# Patient Record
Sex: FEMALE | Race: WHITE | NOT HISPANIC OR LATINO | Employment: FULL TIME | URBAN - METROPOLITAN AREA
[De-identification: names, ages, dates, MRNs, and addresses within clinical notes are randomized per-mention and may not be internally consistent; named-entity substitution may affect disease eponyms.]

---

## 2017-09-18 ENCOUNTER — CLINICAL SUPPORT (OUTPATIENT)
Dept: CARDIOLOGY | Facility: CLINIC | Age: 52
End: 2017-09-18
Payer: COMMERCIAL

## 2017-09-18 ENCOUNTER — OFFICE VISIT (OUTPATIENT)
Dept: INTERNAL MEDICINE | Facility: CLINIC | Age: 52
End: 2017-09-18
Payer: COMMERCIAL

## 2017-09-18 ENCOUNTER — LAB VISIT (OUTPATIENT)
Dept: LAB | Facility: HOSPITAL | Age: 52
End: 2017-09-18
Attending: FAMILY MEDICINE
Payer: COMMERCIAL

## 2017-09-18 VITALS
DIASTOLIC BLOOD PRESSURE: 70 MMHG | HEIGHT: 64 IN | BODY MASS INDEX: 32.97 KG/M2 | OXYGEN SATURATION: 96 % | SYSTOLIC BLOOD PRESSURE: 126 MMHG | WEIGHT: 193.13 LBS | TEMPERATURE: 98 F | HEART RATE: 85 BPM

## 2017-09-18 DIAGNOSIS — R06.09 DOE (DYSPNEA ON EXERTION): ICD-10-CM

## 2017-09-18 DIAGNOSIS — Z00.00 ROUTINE HEALTH MAINTENANCE: Primary | ICD-10-CM

## 2017-09-18 DIAGNOSIS — Z11.59 NEED FOR HEPATITIS C SCREENING TEST: ICD-10-CM

## 2017-09-18 DIAGNOSIS — F17.200 SMOKER: ICD-10-CM

## 2017-09-18 DIAGNOSIS — Z00.00 ROUTINE HEALTH MAINTENANCE: ICD-10-CM

## 2017-09-18 PROCEDURE — 80053 COMPREHEN METABOLIC PANEL: CPT

## 2017-09-18 PROCEDURE — 90732 PPSV23 VACC 2 YRS+ SUBQ/IM: CPT | Mod: S$GLB,,, | Performed by: FAMILY MEDICINE

## 2017-09-18 PROCEDURE — 99999 PR PBB SHADOW E&M-NEW PATIENT-LVL III: CPT | Mod: PBBFAC,,, | Performed by: FAMILY MEDICINE

## 2017-09-18 PROCEDURE — 85025 COMPLETE CBC W/AUTO DIFF WBC: CPT

## 2017-09-18 PROCEDURE — 80061 LIPID PANEL: CPT

## 2017-09-18 PROCEDURE — 90471 IMMUNIZATION ADMIN: CPT | Mod: S$GLB,,, | Performed by: FAMILY MEDICINE

## 2017-09-18 PROCEDURE — 99386 PREV VISIT NEW AGE 40-64: CPT | Mod: 25,S$GLB,, | Performed by: FAMILY MEDICINE

## 2017-09-18 PROCEDURE — 93000 ELECTROCARDIOGRAM COMPLETE: CPT | Mod: S$GLB,,, | Performed by: INTERNAL MEDICINE

## 2017-09-18 PROCEDURE — 86803 HEPATITIS C AB TEST: CPT

## 2017-09-18 PROCEDURE — 36415 COLL VENOUS BLD VENIPUNCTURE: CPT | Mod: PO

## 2017-09-18 RX ORDER — ALBUTEROL SULFATE 90 UG/1
2 AEROSOL, METERED RESPIRATORY (INHALATION) EVERY 6 HOURS PRN
Qty: 1 EACH | Refills: 11 | Status: SHIPPED | OUTPATIENT
Start: 2017-09-18 | End: 2022-07-12 | Stop reason: ALTCHOICE

## 2017-09-18 RX ORDER — HALOBETASOL PROPIONATE 0.5 MG/G
CREAM TOPICAL 2 TIMES DAILY
COMMUNITY
End: 2017-09-18 | Stop reason: SDUPTHER

## 2017-09-18 RX ORDER — TRIAMCINOLONE ACETONIDE 1 MG/G
CREAM TOPICAL 2 TIMES DAILY
Qty: 45 G | Refills: 11 | Status: SHIPPED | OUTPATIENT
Start: 2017-09-18 | End: 2017-11-29 | Stop reason: SDUPTHER

## 2017-09-18 RX ORDER — HALOBETASOL PROPIONATE 0.5 MG/G
CREAM TOPICAL 2 TIMES DAILY
Qty: 50 G | Refills: 2 | Status: SHIPPED | OUTPATIENT
Start: 2017-09-18

## 2017-09-18 NOTE — PROGRESS NOTES
Subjective:       Patient ID: Pilar Flores is a 51 y.o. female.    Chief Complaint: here for physical examination and issues  below    HPIhere for phys [facility] [place] care. One yr [employer] gradual. No chest pain w exertion. occas ant chest pain w stress  Past Medical History:   Diagnosis Date    Hyperlipidemia     Psoriasis     Smoker      Past Surgical History:   Procedure Laterality Date    UTERINE FIBROID SURGERY       Family History   Problem Relation Age of Onset    Heart disease Mother     Cancer Father      Social History     Social History    Marital status:      Spouse name: N/A    Number of children: N/A    Years of education: N/A     Social History Main Topics    Smoking status: Current Every Day Smoker     Packs/day: 1.50     Years: 35.00    Smokeless tobacco: Never Used    Alcohol use No    Drug use: No    Sexual activity: Not Asked     Other Topics Concern    None     Social History Narrative    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 She appears well-developed and well-nourished. No distress.   HENT:   Head: Atraumatic.   Right Ear: External ear normal.   Left Ear: External ear normal.   Nose: Nose normal.   Mouth/Throat: Oropharynx is clear and moist. No oropharyngeal exudate.   bilat tms nl   Eyes: Conjunctivae and EOM are normal. Pupils are equal, round, and reactive to light. No scleral icterus.   Neck: Normal range of motion. Neck supple. No thyromegaly present.   Cardiovascular: Normal rate, regular rhythm and normal heart sounds.    No murmur heard.  Pulmonary/Chest: Effort normal and breath sounds normal. No respiratory distress. She has no wheezes. She has no rales.   Abdominal: Soft. Bowel sounds are normal. She exhibits no distension and no mass. There is no  hepatosplenomegaly. There is no tenderness. There is no rebound and no guarding.   Musculoskeletal: Normal range of motion. She exhibits no edema or tenderness.   Lymphadenopathy:     She has no cervical adenopathy.   Neurological: She is alert and oriented to person, place, and time. No cranial nerve deficit. She exhibits normal muscle tone. Coordination normal.   Skin: Skin is warm. No rash noted. No erythema. No pallor.   Psychiatric: She has a normal mood and affect. Her behavior is normal. Judgment and thought content normal.   Nursing note and vitals reviewed.    ext no edema    Assessment:       1. Routine health maintenance    2. Smoker    3. Need for hepatitis C screening test    4. CYR (dyspnea on exertion)        Plan:       **Routine health maintenance  -     Comprehensive metabolic panel; Future; Expected date: 09/18/2017  -     Lipid panel; Future; Expected date: 09/18/2017  -     CBC auto differential; Future; Expected date: 09/18/2017    Smoker  -     Ambulatory referral to Smoking Cessation Program  -     CT Chest Lung Screening Low Dose; Future; Expected date: 09/18/2017    Need for hepatitis C screening test  -     Hepatitis C antibody; Future; Expected date: 09/18/2017    CYR (dyspnea on exertion)  -     EKG 12-lead; Future; Expected date: 09/18/2017  -     Exercise stress echo; Future    Other orders  -     halobetasol (ULTRAVATE) 0.05 % cream; Apply topically 2 (two) times daily.  Dispense: 50 g; Refill: 2  -     triamcinolone acetonide 0.1% (KENALOG) 0.1 % cream; Apply topically 2 (two) times daily.  Dispense: 45 g; Refill: 11  -     (In Office Administered) Pneumococcal Polysaccharide Vaccine (23 Valent) (SQ/IM)  -     albuterol 90 mcg/actuation inhaler; Inhale 2 puffs into the lungs every 6 (six) hours as needed for Wheezing.  Dispense: 1 each; Refill: 11    *  Return in about 4 weeks (around 10/16/2017).  If stress tst ok then pfts

## 2017-09-19 LAB
ALBUMIN SERPL BCP-MCNC: 3.9 G/DL
ALP SERPL-CCNC: 96 U/L
ALT SERPL W/O P-5'-P-CCNC: 29 U/L
ANION GAP SERPL CALC-SCNC: 12 MMOL/L
AST SERPL-CCNC: 22 U/L
BASOPHILS # BLD AUTO: 0.04 K/UL
BASOPHILS NFR BLD: 0.3 %
BILIRUB SERPL-MCNC: 0.3 MG/DL
BUN SERPL-MCNC: 18 MG/DL
CALCIUM SERPL-MCNC: 9.7 MG/DL
CHLORIDE SERPL-SCNC: 104 MMOL/L
CHOLEST SERPL-MCNC: 256 MG/DL
CHOLEST/HDLC SERPL: 5.2 {RATIO}
CO2 SERPL-SCNC: 27 MMOL/L
CREAT SERPL-MCNC: 0.8 MG/DL
DIFFERENTIAL METHOD: ABNORMAL
EOSINOPHIL # BLD AUTO: 0.2 K/UL
EOSINOPHIL NFR BLD: 1.3 %
ERYTHROCYTE [DISTWIDTH] IN BLOOD BY AUTOMATED COUNT: 12.5 %
EST. GFR  (AFRICAN AMERICAN): >60 ML/MIN/1.73 M^2
EST. GFR  (NON AFRICAN AMERICAN): >60 ML/MIN/1.73 M^2
GLUCOSE SERPL-MCNC: 82 MG/DL
HCT VFR BLD AUTO: 43 %
HDLC SERPL-MCNC: 49 MG/DL
HDLC SERPL: 19.1 %
HGB BLD-MCNC: 14.1 G/DL
LDLC SERPL CALC-MCNC: 164 MG/DL
LYMPHOCYTES # BLD AUTO: 3.2 K/UL
LYMPHOCYTES NFR BLD: 26 %
MCH RBC QN AUTO: 31.4 PG
MCHC RBC AUTO-ENTMCNC: 32.8 G/DL
MCV RBC AUTO: 96 FL
MONOCYTES # BLD AUTO: 0.9 K/UL
MONOCYTES NFR BLD: 7.1 %
NEUTROPHILS # BLD AUTO: 8.1 K/UL
NEUTROPHILS NFR BLD: 64.7 %
NONHDLC SERPL-MCNC: 207 MG/DL
PLATELET # BLD AUTO: 236 K/UL
PMV BLD AUTO: 12.9 FL
POTASSIUM SERPL-SCNC: 4 MMOL/L
PROT SERPL-MCNC: 7.2 G/DL
RBC # BLD AUTO: 4.49 M/UL
SODIUM SERPL-SCNC: 143 MMOL/L
TRIGL SERPL-MCNC: 215 MG/DL
WBC # BLD AUTO: 12.45 K/UL

## 2017-09-20 LAB — HCV AB SERPL QL IA: NEGATIVE

## 2017-09-27 ENCOUNTER — CLINICAL SUPPORT (OUTPATIENT)
Dept: SMOKING CESSATION | Facility: CLINIC | Age: 52
End: 2017-09-27
Payer: COMMERCIAL

## 2017-09-27 DIAGNOSIS — F17.210 HEAVY SMOKER (MORE THAN 20 CIGARETTES PER DAY): Primary | ICD-10-CM

## 2017-09-27 PROCEDURE — 99999 PR PBB SHADOW E&M-EST. PATIENT-LVL I: CPT | Mod: PBBFAC,,,

## 2017-09-27 PROCEDURE — 99404 PREV MED CNSL INDIV APPRX 60: CPT | Mod: S$GLB,,,

## 2017-09-27 RX ORDER — IBUPROFEN 200 MG
1 TABLET ORAL DAILY
Qty: 14 PATCH | Refills: 0 | Status: SHIPPED | OUTPATIENT
Start: 2017-09-27 | End: 2017-10-09 | Stop reason: SDUPTHER

## 2017-09-27 RX ORDER — BUPROPION HYDROCHLORIDE 150 MG/1
150 TABLET, EXTENDED RELEASE ORAL 2 TIMES DAILY
Qty: 60 TABLET | Refills: 0 | Status: SHIPPED | OUTPATIENT
Start: 2017-09-27 | End: 2017-10-23 | Stop reason: SDUPTHER

## 2017-09-27 RX ORDER — VARENICLINE TARTRATE 0.5 (11)-1
KIT ORAL
Qty: 1 PACKAGE | Refills: 0 | Status: SHIPPED | OUTPATIENT
Start: 2017-09-27 | End: 2017-10-23 | Stop reason: DRUGHIGH

## 2017-10-02 ENCOUNTER — CLINICAL SUPPORT (OUTPATIENT)
Dept: SMOKING CESSATION | Facility: CLINIC | Age: 52
End: 2017-10-02
Payer: COMMERCIAL

## 2017-10-02 DIAGNOSIS — F17.210 HEAVY SMOKER (MORE THAN 20 CIGARETTES PER DAY): Primary | ICD-10-CM

## 2017-10-02 PROCEDURE — 90853 GROUP PSYCHOTHERAPY: CPT | Mod: S$GLB,,,

## 2017-10-02 NOTE — Clinical Note
Patient seen for smoking cessation group session. She has not decreased the amount of cigarettes she is smoking since last week. She was encouraged to set a target quit date. The patient remains on the prescribed tobacco cessation medication regimen of 1 mg Chantix BID, 150 mg Wellbutrin SR BID and a 21 mg nicotine patch QD without any negative side effects at this time.

## 2017-10-03 NOTE — PROGRESS NOTES
Site: [facility]  Date:  10/2/17  Clinical Status of Patient: Outpatient   Length of Service and Code: 90 minutes - 61695   Number in Attendance: 3  Group Activities/Focus of Group: The patient will continue with group therapy sessions and medication monitoring by CTTS. Prescribed medication management will be by physician. Orientation, client introductions, completion of TCRS (Tobacco Cessation Rating Scale) learned addiction model, cues/triggers, personal reasons for quitting, medications, goals and quit date.    Target symptoms:  withdrawal and medication side effects             The following were rated moderate (3) to severe (4) on TCRS:       Moderate 3: none     Severe 4:   none  Patient's Response to Intervention: Patient seen for smoking cessation group session. She has not decreased the amount of cigarettes she is smoking since last week. She was encouraged to set a target quit date. The patient remains on the prescribed tobacco cessation medication regimen of 1 mg Chantix BID, 150 mg Wellbutrin SR BID and a 21 mg nicotine patch QD without any negative side effects at this time.     Progress Toward Goals and Other Mental Status Changes: The patient denies any abnormal behavioral or mental changes at this time.     Plan: The patient will continue with group therapy sessions and medication monitoring by CTTS. Prescribed medication management will be by physician.     Return to Clinic: 1 week

## 2017-10-05 ENCOUNTER — CLINICAL SUPPORT (OUTPATIENT)
Dept: CARDIOLOGY | Facility: CLINIC | Age: 52
End: 2017-10-05
Payer: COMMERCIAL

## 2017-10-05 DIAGNOSIS — R06.09 DOE (DYSPNEA ON EXERTION): ICD-10-CM

## 2017-10-05 PROCEDURE — 93321 DOPPLER ECHO F-UP/LMTD STD: CPT | Mod: S$GLB,,, | Performed by: NUCLEAR MEDICINE

## 2017-10-05 PROCEDURE — 93351 STRESS TTE COMPLETE: CPT | Mod: S$GLB,,, | Performed by: NUCLEAR MEDICINE

## 2017-10-06 LAB
DIASTOLIC DYSFUNCTION: NO
RETIRED EF AND QEF - SEE NOTES: 55 (ref 55–65)

## 2017-10-09 ENCOUNTER — CLINICAL SUPPORT (OUTPATIENT)
Dept: SMOKING CESSATION | Facility: CLINIC | Age: 52
End: 2017-10-09
Payer: COMMERCIAL

## 2017-10-09 DIAGNOSIS — F17.210 CIGARETTE NICOTINE DEPENDENCE WITHOUT COMPLICATION: Primary | ICD-10-CM

## 2017-10-09 DIAGNOSIS — F17.210 HEAVY SMOKER (MORE THAN 20 CIGARETTES PER DAY): ICD-10-CM

## 2017-10-09 PROCEDURE — 90853 GROUP PSYCHOTHERAPY: CPT | Mod: S$GLB,,,

## 2017-10-09 RX ORDER — IBUPROFEN 200 MG
1 TABLET ORAL DAILY
Qty: 14 PATCH | Refills: 0 | Status: SHIPPED | OUTPATIENT
Start: 2017-10-09 | End: 2017-10-23 | Stop reason: SDUPTHER

## 2017-10-09 NOTE — Clinical Note
Patient reports being tobacco free since 10/4/17. She was smoker of 1.5 packs of cigarettes per day for 40 years. Her spouse is quitting too. She is pleased with her progress and intends to never purchase cigarettes again.  The patient remains on the prescribed tobacco cessation medication regimen of 1 mg Chantix BID, 150 mg Wellbutrin SR BID and a 21 mg nicotine patch QD without any negative side effects at this time.

## 2017-10-09 NOTE — PROGRESS NOTES
Smoking Cessation Group Session #3    Site: [facility]  Date:  10/9/2017  Clinical Status of Patient: Outpatient   Length of Service and Code: 90 minutes - 94621   Number in Attendance: 2  Group Activities/Focus of Group:  completion of TCRS (Tobacco Cessation Rating Scale) reviewed strategies, controlling environment, cues, triggers, new goals set. Introduced high risk situations with preparation interventions, caffeine similarities with withdrawal issues of habit and nicotine, Alcohol, Understanding urges, cravings, stress and relaxation. Open discussion with intervention discussion.     Target symptoms:  withdrawal and medication side effects             The following were rated moderate (3) to severe (4) on TCRS:       Moderate 3: none     Severe 4:   none  Patient's Response to Intervention: Patient reports being tobacco free since 10/4/17. She was smoker of 1.5 packs of cigarettes per day for 40 years. Her spouse is quitting too. She is pleased with her progress and intends to never purchase cigarettes again.  The patient remains on the prescribed tobacco cessation medication regimen of 1 mg Chantix BID, 150 mg Wellbutrin SR BID and a 21 mg nicotine patch QD without any negative side effects at this time.     Progress Toward Goals and Other Mental Status Changes: The patient denies any abnormal behavioral or mental changes at this time.     Plan: The patient will continue with group therapy sessions and medication monitoring by CTTS. Prescribed medication management will be by physician.     Return to Clinic: 1 week    Quit Date: 10/4/17   Planned Quit Date: Quit 10/4/17

## 2017-10-16 ENCOUNTER — CLINICAL SUPPORT (OUTPATIENT)
Dept: SMOKING CESSATION | Facility: CLINIC | Age: 52
End: 2017-10-16
Payer: COMMERCIAL

## 2017-10-16 DIAGNOSIS — F17.210 CIGARETTE NICOTINE DEPENDENCE WITHOUT COMPLICATION: Primary | ICD-10-CM

## 2017-10-16 PROCEDURE — 90853 GROUP PSYCHOTHERAPY: CPT | Mod: S$GLB,,,

## 2017-10-16 NOTE — Clinical Note
Patient reports remaining tobacco free since 10/4/17. She did report feeling stressed at work and is aware this is a strong trigger for her. She inquired about nicotine lozenges for work to get past the urge to smoke. She has started using the stairs at work to help prevent weight gain. The patient remains on the prescribed tobacco cessation medication regimen of 1 mg Chantix BID with 150 mg Wellbutrin SR BID and a 21 mg nicotine patch QD without any negative side effects at this time.

## 2017-10-17 RX ORDER — ASPIRIN/CALCIUM CARB/MAGNESIUM 325 MG
4 TABLET ORAL
Qty: 72 LOZENGE | Refills: 0 | Status: SHIPPED | OUTPATIENT
Start: 2017-10-17 | End: 2017-11-06 | Stop reason: SDUPTHER

## 2017-10-17 NOTE — PROGRESS NOTES
Smoking Cessation Group Session #3    Site: [facility]  Date:  10/106/17  Clinical Status of Patient: Outpatient   Length of Service and Code: 90 minutes - 32012   Number in Attendance: 2  Group Activities/Focus of Group:  completion of TCRS (Tobacco Cessation Rating Scale) reviewed strategies, controlling environment, cues, triggers, new goals set. Introduced high risk situations with preparation interventions, caffeine similarities with withdrawal issues of habit and nicotine, Alcohol, Understanding urges, cravings, stress and relaxation. Open discussion with intervention discussion.    Target symptoms:  withdrawal and medication side effects             The following were rated moderate (3) to severe (4) on TCRS:       Moderate 3: none     Severe 4:   none  Patient's Response to Intervention: Patient reports remaining tobacco free since 10/4/17. She did report feeling stressed at work and is aware this is a strong trigger for her. She inquired about nicotine lozenges for work to get past the urge to smoke. She has started using the stairs at work to help prevent weight gain. The patient remains on the prescribed tobacco cessation medication regimen of 1 mg Chantix BID with 150 mg Wellbutrin SR BID and a 21 mg nicotine patch QD without any negative side effects at this time.     Progress Toward Goals and Other Mental Status Changes: The patient denies any abnormal behavioral or mental changes at this time.     Plan: The patient will continue with group therapy sessions and medication monitoring by CTTS. Prescribed medication management will be by physician.     Return to Clinic: 1 week    Quit Date: 10/4/17   Planned Quit Date: QUIT 10/4/17

## 2017-10-23 ENCOUNTER — TELEPHONE (OUTPATIENT)
Dept: RADIOLOGY | Facility: HOSPITAL | Age: 52
End: 2017-10-23

## 2017-10-23 ENCOUNTER — CLINICAL SUPPORT (OUTPATIENT)
Dept: SMOKING CESSATION | Facility: CLINIC | Age: 52
End: 2017-10-23
Payer: COMMERCIAL

## 2017-10-23 ENCOUNTER — OFFICE VISIT (OUTPATIENT)
Dept: INTERNAL MEDICINE | Facility: CLINIC | Age: 52
End: 2017-10-23
Payer: COMMERCIAL

## 2017-10-23 VITALS
OXYGEN SATURATION: 98 % | BODY MASS INDEX: 33.2 KG/M2 | HEART RATE: 76 BPM | SYSTOLIC BLOOD PRESSURE: 126 MMHG | WEIGHT: 194.44 LBS | TEMPERATURE: 97 F | DIASTOLIC BLOOD PRESSURE: 70 MMHG | HEIGHT: 64 IN

## 2017-10-23 DIAGNOSIS — Z12.31 ENCOUNTER FOR SCREENING MAMMOGRAM FOR MALIGNANT NEOPLASM OF BREAST: ICD-10-CM

## 2017-10-23 DIAGNOSIS — F17.210 CIGARETTE NICOTINE DEPENDENCE WITHOUT COMPLICATION: ICD-10-CM

## 2017-10-23 DIAGNOSIS — G47.00 INSOMNIA, UNSPECIFIED TYPE: ICD-10-CM

## 2017-10-23 DIAGNOSIS — F41.9 ANXIETY: ICD-10-CM

## 2017-10-23 DIAGNOSIS — R06.09 DOE (DYSPNEA ON EXERTION): Primary | ICD-10-CM

## 2017-10-23 DIAGNOSIS — R07.9 CHEST PAIN, UNSPECIFIED TYPE: ICD-10-CM

## 2017-10-23 DIAGNOSIS — F17.210 HEAVY SMOKER (MORE THAN 20 CIGARETTES PER DAY): ICD-10-CM

## 2017-10-23 DIAGNOSIS — E78.00 HYPERCHOLESTEREMIA: ICD-10-CM

## 2017-10-23 DIAGNOSIS — Z11.51 SCREENING FOR HPV (HUMAN PAPILLOMAVIRUS): ICD-10-CM

## 2017-10-23 DIAGNOSIS — Z87.891 EX-SMOKER: ICD-10-CM

## 2017-10-23 DIAGNOSIS — Z12.11 SCREEN FOR COLON CANCER: ICD-10-CM

## 2017-10-23 PROCEDURE — 90471 IMMUNIZATION ADMIN: CPT | Mod: S$GLB,,, | Performed by: FAMILY MEDICINE

## 2017-10-23 PROCEDURE — 99999 PR PBB SHADOW E&M-EST. PATIENT-LVL IV: CPT | Mod: PBBFAC,,, | Performed by: FAMILY MEDICINE

## 2017-10-23 PROCEDURE — 90472 IMMUNIZATION ADMIN EACH ADD: CPT | Mod: S$GLB,,, | Performed by: FAMILY MEDICINE

## 2017-10-23 PROCEDURE — 90853 GROUP PSYCHOTHERAPY: CPT | Mod: S$GLB,,,

## 2017-10-23 PROCEDURE — 99214 OFFICE O/P EST MOD 30 MIN: CPT | Mod: 25,S$GLB,, | Performed by: FAMILY MEDICINE

## 2017-10-23 PROCEDURE — 90686 IIV4 VACC NO PRSV 0.5 ML IM: CPT | Mod: S$GLB,,, | Performed by: FAMILY MEDICINE

## 2017-10-23 PROCEDURE — 90715 TDAP VACCINE 7 YRS/> IM: CPT | Mod: S$GLB,,, | Performed by: FAMILY MEDICINE

## 2017-10-23 RX ORDER — IBUPROFEN 200 MG
1 TABLET ORAL DAILY
Qty: 14 PATCH | Refills: 0 | Status: SHIPPED | OUTPATIENT
Start: 2017-10-23 | End: 2017-11-20 | Stop reason: SDUPTHER

## 2017-10-23 RX ORDER — VARENICLINE TARTRATE 1 MG/1
1 TABLET, FILM COATED ORAL 2 TIMES DAILY
Qty: 58 TABLET | Refills: 0 | Status: SHIPPED | OUTPATIENT
Start: 2017-10-23 | End: 2017-11-20 | Stop reason: SDUPTHER

## 2017-10-23 RX ORDER — LORAZEPAM 0.5 MG/1
0.5 TABLET ORAL EVERY 12 HOURS PRN
Qty: 20 TABLET | Refills: 0 | Status: SHIPPED | OUTPATIENT
Start: 2017-10-23 | End: 2017-11-29

## 2017-10-23 RX ORDER — BUPROPION HYDROCHLORIDE 150 MG/1
150 TABLET, EXTENDED RELEASE ORAL 2 TIMES DAILY
Qty: 60 TABLET | Refills: 0 | Status: SHIPPED | OUTPATIENT
Start: 2017-10-23 | End: 2017-11-20 | Stop reason: SDUPTHER

## 2017-10-23 RX ORDER — MELOXICAM 7.5 MG/1
7.5 TABLET ORAL DAILY PRN
Qty: 30 TABLET | Refills: 2 | Status: SHIPPED | OUTPATIENT
Start: 2017-10-23 | End: 2018-01-26 | Stop reason: SDUPTHER

## 2017-10-23 NOTE — Clinical Note
Patient reports remaining tobacco free since 10/4/17. She reports having a stressful job and this is her strongest trigger to smoke. She and her spouse have quit smoking together. The patient remains on the prescribed tobacco cessation medication regimen of 1 mg Chantix BID with a 150 mg Wellbutrin SR BID with a 21 mg nicotine patch QD without any negative side effects at this time.

## 2017-10-23 NOTE — PROGRESS NOTES
Smoking Cessation Group Session #5    Site: [facility]  Date:  10/23/2017  Clinical Status of Patient: Outpatient   Length of Service and Code: 90 minutes - 63800   Number in Attendance: 2  Group Activities/Focus of Group:  completion of TCRS (Tobacco Cessation Rating Scale) reviewed strategies, habitual behavior, high risks situations, understanding urges and cravings, stress and relaxation with open discussion and additional interventions, Introduced lapses, relapses, understanding them and analyzing the situation of a lapse, conflict issues that may be linked to a lapse.     Target symptoms:  withdrawal and medication side effects             The following were rated moderate (3) to severe (4) on TCRS:       Moderate 3: none     Severe 4:   none  Patient's Response to Intervention: Patient reports remaining tobacco free since 10/4/17. She reports having a stressful job and this is her strongest trigger to smoke. She and her spouse have quit smoking together. The patient remains on the prescribed tobacco cessation medication regimen of 1 mg Chantix BID with a 150 mg Wellbutrin SR BID with a 21 mg nicotine patch QD without any negative side effects at this time.     Progress Toward Goals and Other Mental Status Changes: The patient denies any abnormal behavioral or mental changes at this time.     Plan: The patient will continue with group therapy sessions and medication monitoring by CTTS. Prescribed medication management will be by physician.     Return to Clinic: 1 week    Quit Date: 10/4/17   Planned Quit Date: Quit 10/4/17

## 2017-10-23 NOTE — PATIENT INSTRUCTIONS
Quick, Healthy Ways to Cook  Here are some tips for quick and nutritious food preparation. These methods will save you time and help to cut down on fat.  Stir-frying  If you dont have a wok, use a cast-iron or non-stick skillet. Most dishes can be cooked with just a tablespoon of oil if you heat the pan first. Buy pre-cut vegetables to reduce preparation time. Try stir-frying sliced lean beef or boneless, skinless chicken and ready-cut broccoli. Add a dash of soy sauce and some slices of fresh hilda root.  Microwaving  Microwaves cook very quickly. So most of the nutrients in the foods youre cooking dont have time to escape. Read the cooking directions carefully. Its easy to overcook foods. Use the microwave to cook baked potatoes or winter squash. You can also reheat leftovers and soup. Fish filets can be microwaved in minutes. Just add seasoning and a dash of milk. Then cover with wax paper and cook.  Crock pots  This handy kitchen appliance cooks food slowly at low temperatures. Set it up in the morning. Dinner will be ready and waiting for you when you get home. Soups, stews, and pot roasts all make great crock pot meals. Be sure to trim fat from meat before cooking. Extra-lean, less marbled cuts of meat become tender and juicy when cooked in a crock pot. Add more flavor by using different types of canned tomatoes, herbs, and spices.  Baking, broiling, and grilling  Bake, broil, or grill foods on a rack to drain fats away during cooking. This is a healthier way to eat. And its delicious as well. [place] meat and vegetables, too. Add bell peppers, tomatoes, onions, and mushrooms to kebobs. Or put vegetables in foil-wrapped packets.  Steaming  Steaming can be done in a microwave or on the stove top. Either way, it keeps in nutrients and flavor without adding fat. Ready-cut broccoli, cauliflower, and baby carrots can go right from the bag to the steamer.  Pressure cooking  By using steam, pressure cookers can  cook a pound of potatoes in just 4 minutes. Or a chicken stew in less than half an hour. A pressure cooker can also turn the toughest cut of meat into a tender main course. Dont over-season foods. Pressure cooking uses very little liquid, so flavors are stronger.  Poaching  In poaching, the food is covered with liquid. This could be broth, milk, or wine. The food is then gently simmered until done. Poaching uses less liquid than steaming or boiling. This means that delicate flavors are less diluted. Poaching works well for fish or eggs.  Date Last Reviewed: 6/8/2015  © 2111-8215 [employer]. [address] [place] [address], [place] [address]. All rights reserved. This information is not intended as a substitute for professional medical care. Always follow your healthcare professional's instructions.        Quick, Healthy Ways to Cook  Here are some tips for quick and nutritious food preparation. These methods will save you time and help to cut down on fat.  Stir-frying  If you dont have a wok, use a cast-iron or non-stick skillet. Most dishes can be cooked with just a tablespoon of oil if you heat the pan first. Buy pre-cut vegetables to reduce preparation time. Try stir-frying sliced lean beef or boneless, skinless chicken and ready-cut broccoli. Add a dash of soy sauce and some slices of fresh hilda root.  Microwaving  Microwaves cook very quickly. So most of the nutrients in the foods youre cooking dont have time to escape. Read the cooking directions carefully. Its easy to overcook foods. Use the microwave to cook baked potatoes or winter squash. You can also reheat leftovers and soup. Fish filets can be microwaved in minutes. Just add seasoning and a dash of milk. Then cover with wax paper and cook.  Crock pots  This handy kitchen appliance cooks food slowly at low temperatures. Set it up in the morning. Dinner will be ready and waiting for you when you get home. Soups, stews, and pot roasts all make  great crock pot meals. Be sure to trim fat from meat before cooking. Extra-lean, less marbled cuts of meat become tender and juicy when cooked in a crock pot. Add more flavor by using different types of canned tomatoes, herbs, and spices.  Baking, broiling, and grilling  Bake, broil, or grill foods on a rack to drain fats away during cooking. This is a healthier way to eat. And its delicious as well. [place] meat and vegetables, too. Add bell peppers, tomatoes, onions, and mushrooms to kebobs. Or put vegetables in foil-wrapped packets.  Steaming  Steaming can be done in a microwave or on the stove top. Either way, it keeps in nutrients and flavor without adding fat. Ready-cut broccoli, cauliflower, and baby carrots can go right from the bag to the steamer.  Pressure cooking  By using steam, pressure cookers can cook a pound of potatoes in just 4 minutes. Or a chicken stew in less than half an hour. A pressure cooker can also turn the toughest cut of meat into a tender main course. Dont over-season foods. Pressure cooking uses very little liquid, so flavors are stronger.  Poaching  In poaching, the food is covered with liquid. This could be broth, milk, or wine. The food is then gently simmered until done. Poaching uses less liquid than steaming or boiling. This means that delicate flavors are less diluted. Poaching works well for fish or eggs.  Date Last Reviewed: 6/8/2015  © 3095-2380 [employer]. [address] [place] [address], [place] [address]. All rights reserved. This information is not intended as a substitute for professional medical care. Always follow your healthcare professional's instructions.        Low-Cholesterol Diet  Your body needs cholesterol to build new cells and create certain hormones. There are 2 kinds of cholesterol in your blood:     · HDL (good) cholesterol. This prevents fat deposits (plaque) from building up in your arteries. In this way it protects against heart disease and  stroke.  · LDL (bad) cholesterol. This stays in your body and sticks to artery walls. Over time it may block blood flow to the heart and brain. This can cause a heart attack or stroke.  The cholesterol in your blood comes from 2 sources: cholesterol in food that you eat and cholesterol that your liver makes. You should limit the amount of cholesterol in your diet. But the cholesterol that your body makes has the greatest disease risk. And your body makes more cholesterol when your diet is high in bad fats (saturated and trans fats). There are 2 kinds of fats you can eat:  · Good fats, or unsaturated fats (mono-unsaturated and poly-unsaturated). They raise the level of good cholesterol and lower the level of bad cholesterol. Good fats are found in vegetable oils such as olive, sunflower, corn, and soybean oils, and in nuts and seeds.  · Bad fats, or saturated fats (including foods high in cholesterol) and trans fats. These raise your risk of disease. They lower the good cholesterol and raise the level of bad cholesterol. Bad fats are found in animal products, including meat, whole-milk dairy products, and butter. Some plants are also high in bad fats (coconut and palm plants). Trans fats are found in hard (stick) margarines. They are also in many fast foods and commercially baked goods. Soft margarine sold in tubs has fewer trans fats and is safer to use.  High blood cholesterol is usually due to a diet high in saturated fat, along with not being physically active. In some cases, genetics plays a role in causing high cholesterol. The tips below will help you create healthy eating habits that will help lower your blood cholesterol level.  Create a diet high in good fats, low in bad fats (and low in cholesterol)  The following steps will help you create a diet high in good fats and low in bad fats:  · Talk with your doctor before starting a low cholesterol diet or weight loss program.  · Learn to read nutrition  labels and select appropriate portion sizes.  · When cooking, use plant-based unsaturated vegetable oils (sunflower, corn, soybean, canola, peanut, and olive oils).  · Avoid saturated fats found in animal products such as meat, dairy (whole-milk, cheese and ice cream), poultry skin, and egg yolks. Plants high in saturated oils include coconut oil, palm oil, and palm kernel oil.  · If you eat meat, choose smaller portions and lean cuts, such as round, desire, sirloin, or loin. Eat more meatless meals.  · Replace meat with fish at least 2 times a week. Fish is an important source of the unsaturated fat called omega-3 fatty acids. This fat has potential to lower the risk of heart disease.  · Replace whole-milk dairy products with low-fat or nonfat products. Try soy products. Soy helps to reduce total cholesterol.  · Supplement your diet with protective fibers. Eat nuts, seeds, and whole grains rather than white rice and bread. These foods lower both cholesterol and triglyceride levels. (Triglycerides are another fat found in the blood.) Walnuts are one of the best sources of omega-3 fatty acids.  · Eat plenty of fresh fruits and vegetables daily.  · Avoid fast foods and commercial baked goods. Assume they contain saturated fat unless labeled otherwise.  Date Last Reviewed: 8/1/2016  © 4931-6691 [employer]. [address] [place] [address], [place] [address]. All rights reserved. This information is not intended as a substitute for professional medical care. Always follow your healthcare professional's instructions.

## 2017-10-25 ENCOUNTER — HOSPITAL ENCOUNTER (OUTPATIENT)
Dept: RADIOLOGY | Facility: HOSPITAL | Age: 52
Discharge: HOME | End: 2017-10-25
Attending: FAMILY MEDICINE

## 2017-10-25 DIAGNOSIS — F17.200 SMOKER: ICD-10-CM

## 2017-10-25 PROCEDURE — 76497 UNLISTED CT PROCEDURE: CPT | Mod: TC,PO

## 2017-10-30 ENCOUNTER — CLINICAL SUPPORT (OUTPATIENT)
Dept: SMOKING CESSATION | Facility: CLINIC | Age: 52
End: 2017-10-30
Payer: COMMERCIAL

## 2017-10-30 DIAGNOSIS — F17.210 CIGARETTE NICOTINE DEPENDENCE WITHOUT COMPLICATION: Primary | ICD-10-CM

## 2017-10-30 PROCEDURE — 99407 BEHAV CHNG SMOKING > 10 MIN: CPT | Mod: S$GLB,,,

## 2017-11-06 NOTE — PROGRESS NOTES
Subjective:       Patient ID: Pilar Flores is a 52 y.o. female.    Chief Complaint: Multiple issues see below    HPIstress test dec sensitivy ;wasnt able to complete; still some hogan less after dc tob; occas nonexert ant chest pain and left should pain only w rom  infreq anxiety poss related to smoking cess. ; req short term anxiety med. dwd options. D/w benzo sedation and no etoh w etc. Side effects and dosing discussed  ';mood fine  Past Medical History:   Diagnosis Date    Hyperlipidemia     Psoriasis     Smoker      Past Surgical History:   Procedure Laterality Date    UTERINE FIBROID SURGERY       Family History   Problem Relation Age of Onset    Heart disease Mother     Cancer Father      Social History     Social History    Marital status:      Spouse name: N/A    Number of children: N/A    Years of education: N/A     Social History Main Topics    Smoking status: Former Smoker     Packs/day: 1.50     Years: 40.00    Smokeless tobacco: Never Used      Comment: Quit 10/4/17.   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 HOGAN (dyspnea on exertion)    2. Chest pain, unspecified type    3. Ex-smoker    4. Hypercholesteremia    5. Encounter for screening mammogram for malignant neoplasm of breast    6. Screen for colon cancer    7. Screening for HPV (human papillomavirus)    8. Anxiety    9. Insomnia, unspecified type        Plan:       *HOGAN (dyspnea on exertion)  -     NM Myocardial Perfusion Spect Multi Pharmacologic; Future; Expected date: 10/23/2017  -     NM Multi Pharm Stress Cardiac Component; Future    Chest pain, unspecified type  -     NM Myocardial Perfusion Spect Multi Pharmacologic; Future;  Expected date: 10/23/2017  -     NM Multi Pharm Stress Cardiac Component; Future    Ex-smoker    Hypercholesteremia  -     Comprehensive metabolic panel; Future; Expected date: 10/23/2018  -     Lipid panel; Future; Expected date: 10/23/2018  -     TSH; Future; Expected date: 10/23/2018    Encounter for screening mammogram for malignant neoplasm of breast  -     Mammo Digital Screening Bilat with CAD; Future; Expected date: 10/23/2017    Screen for colon cancer  -     Case request GI: COLONOSCOPY    Screening for HPV (human papillomavirus)  -     Ambulatory referral to Gynecology    Anxiety    Insomnia, unspecified type    Other orders  -     (In Office Administered) Tdap Vaccine  -     meloxicam (MOBIC) 7.5 MG tablet; Take 1 tablet (7.5 mg total) by mouth daily as needed for Pain.  Dispense: 30 tablet; Refill: 2  -     lorazepam (ATIVAN) 0.5 MG tablet; Take 1 tablet (0.5 mg total) by mouth every 12 (twelve) hours as needed for Anxiety.  Dispense: 20 tablet; Refill: 0  -     Influenza - Quadrivalent (3 years & older) (PF)    **  Flu shot  She req Female gyn for pap    If stress test ok then pft  Cont no smoking  Has ldct soon  Asa  Low chol diet  F/u one yr if all ok above and hogan resolves off tob   PT when ready  Should exer  Short term prn mobic

## 2017-11-13 ENCOUNTER — TELEPHONE (OUTPATIENT)
Dept: CARDIOLOGY | Facility: CLINIC | Age: 52
End: 2017-11-13

## 2017-11-13 ENCOUNTER — PATIENT MESSAGE (OUTPATIENT)
Dept: INTERNAL MEDICINE | Facility: CLINIC | Age: 52
End: 2017-11-13

## 2017-11-13 DIAGNOSIS — R91.1 LUNG NODULE: Primary | ICD-10-CM

## 2017-11-13 NOTE — TELEPHONE ENCOUNTER
----- Message from Junito Victor sent at 11/9/2017 12:58 PM CST -----  Contact: [facility]   [phone]  Would like to consult with nurse regarding presription. Please call back at [phone].

## 2017-11-16 DIAGNOSIS — F17.210 CIGARETTE NICOTINE DEPENDENCE WITHOUT COMPLICATION: ICD-10-CM

## 2017-11-17 DIAGNOSIS — F17.210 CIGARETTE NICOTINE DEPENDENCE WITHOUT COMPLICATION: ICD-10-CM

## 2017-11-20 ENCOUNTER — CLINICAL SUPPORT (OUTPATIENT)
Dept: SMOKING CESSATION | Facility: CLINIC | Age: 52
End: 2017-11-20
Payer: COMMERCIAL

## 2017-11-20 DIAGNOSIS — F17.210 CIGARETTE NICOTINE DEPENDENCE WITHOUT COMPLICATION: Primary | ICD-10-CM

## 2017-11-20 PROCEDURE — 99407 BEHAV CHNG SMOKING > 10 MIN: CPT | Mod: S$GLB,,,

## 2017-11-20 RX ORDER — VARENICLINE TARTRATE 1 MG/1
1 TABLET, FILM COATED ORAL 2 TIMES DAILY
Qty: 58 TABLET | Refills: 0 | Status: SHIPPED | OUTPATIENT
Start: 2017-11-20 | End: 2022-07-12

## 2017-11-20 RX ORDER — IBUPROFEN 200 MG
1 TABLET ORAL DAILY
Qty: 28 PATCH | Refills: 0 | Status: SHIPPED | OUTPATIENT
Start: 2017-11-20 | End: 2022-07-12

## 2017-11-20 RX ORDER — ASPIRIN/CALCIUM CARB/MAGNESIUM 325 MG
4 TABLET ORAL
Qty: 108 LOZENGE | Refills: 0 | Status: SHIPPED | OUTPATIENT
Start: 2017-11-20 | End: 2022-07-12

## 2017-11-20 RX ORDER — BUPROPION HYDROCHLORIDE 150 MG/1
150 TABLET, EXTENDED RELEASE ORAL 2 TIMES DAILY
Qty: 60 TABLET | Refills: 0 | Status: SHIPPED | OUTPATIENT
Start: 2017-11-20 | End: 2022-07-12

## 2017-11-29 ENCOUNTER — HOSPITAL ENCOUNTER (OUTPATIENT)
Dept: RADIOLOGY | Facility: HOSPITAL | Age: 52
Discharge: HOME | End: 2017-11-29
Attending: PHYSICIAN ASSISTANT
Payer: COMMERCIAL

## 2017-11-29 ENCOUNTER — OFFICE VISIT (OUTPATIENT)
Dept: INTERNAL MEDICINE | Facility: CLINIC | Age: 52
End: 2017-11-29
Payer: COMMERCIAL

## 2017-11-29 VITALS
HEIGHT: 64 IN | DIASTOLIC BLOOD PRESSURE: 70 MMHG | HEART RATE: 94 BPM | BODY MASS INDEX: 32.13 KG/M2 | OXYGEN SATURATION: 95 % | WEIGHT: 188.19 LBS | TEMPERATURE: 98 F | SYSTOLIC BLOOD PRESSURE: 110 MMHG | RESPIRATION RATE: 16 BRPM

## 2017-11-29 DIAGNOSIS — R10.12 COLICKY LUQ ABDOMINAL PAIN: ICD-10-CM

## 2017-11-29 DIAGNOSIS — K57.32 DIVERTICULITIS OF LARGE INTESTINE WITHOUT PERFORATION OR ABSCESS WITHOUT BLEEDING: ICD-10-CM

## 2017-11-29 DIAGNOSIS — R10.12 COLICKY LUQ ABDOMINAL PAIN: Primary | ICD-10-CM

## 2017-11-29 PROBLEM — E78.5 HYPERLIPIDEMIA: Status: ACTIVE | Noted: 2017-11-29

## 2017-11-29 PROBLEM — E66.811 OBESITY, CLASS I, BMI 30.0-34.9 (SEE ACTUAL BMI): Status: ACTIVE | Noted: 2017-11-29

## 2017-11-29 PROBLEM — F17.200 SMOKER: Status: ACTIVE | Noted: 2017-11-29

## 2017-11-29 PROBLEM — L40.9 PSORIASIS: Status: ACTIVE | Noted: 2017-11-29

## 2017-11-29 PROBLEM — F41.9 ANXIETY: Status: ACTIVE | Noted: 2017-11-29

## 2017-11-29 PROBLEM — E66.9 OBESITY, CLASS I, BMI 30.0-34.9 (SEE ACTUAL BMI): Status: ACTIVE | Noted: 2017-11-29

## 2017-11-29 PROCEDURE — 99214 OFFICE O/P EST MOD 30 MIN: CPT | Mod: 25,S$GLB,, | Performed by: PHYSICIAN ASSISTANT

## 2017-11-29 PROCEDURE — 74020 XR ABDOMEN FLAT AND ERECT: CPT | Mod: TC,PO

## 2017-11-29 PROCEDURE — 96372 THER/PROPH/DIAG INJ SC/IM: CPT | Mod: S$GLB,,, | Performed by: INTERNAL MEDICINE

## 2017-11-29 PROCEDURE — 74020 XR ABDOMEN FLAT AND ERECT: CPT | Mod: 26,,, | Performed by: RADIOLOGY

## 2017-11-29 PROCEDURE — 99999 PR PBB SHADOW E&M-EST. PATIENT-LVL IV: CPT | Mod: PBBFAC,,, | Performed by: PHYSICIAN ASSISTANT

## 2017-11-29 RX ORDER — IBUPROFEN 200 MG
200 TABLET ORAL EVERY 6 HOURS PRN
COMMUNITY
End: 2018-01-30

## 2017-11-29 RX ORDER — CIPROFLOXACIN 500 MG/1
500 TABLET ORAL 2 TIMES DAILY
Qty: 20 TABLET | Refills: 0 | Status: SHIPPED | OUTPATIENT
Start: 2017-11-29 | End: 2017-12-09

## 2017-11-29 RX ORDER — KETOROLAC TROMETHAMINE 30 MG/ML
60 INJECTION, SOLUTION INTRAMUSCULAR; INTRAVENOUS
Status: COMPLETED | OUTPATIENT
Start: 2017-11-29 | End: 2017-11-29

## 2017-11-29 RX ORDER — TRIAMCINOLONE ACETONIDE 1 MG/G
1 CREAM TOPICAL 2 TIMES DAILY
Refills: 11 | COMMUNITY
Start: 2017-11-06 | End: 2022-07-12

## 2017-11-29 RX ORDER — ASPIRIN 81 MG/1
81 TABLET ORAL DAILY
COMMUNITY

## 2017-11-29 RX ORDER — DICYCLOMINE HYDROCHLORIDE 20 MG/1
20 TABLET ORAL EVERY 6 HOURS
Qty: 120 TABLET | Refills: 0 | Status: SHIPPED | OUTPATIENT
Start: 2017-11-29 | End: 2018-01-03 | Stop reason: SDUPTHER

## 2017-11-29 RX ORDER — OMEPRAZOLE 20 MG/1
20 TABLET, DELAYED RELEASE ORAL DAILY
COMMUNITY
End: 2022-07-12

## 2017-11-29 RX ADMIN — KETOROLAC TROMETHAMINE 60 MG: 30 INJECTION, SOLUTION INTRAMUSCULAR; INTRAVENOUS at 11:11

## 2017-11-29 NOTE — PROGRESS NOTES
Pt refused to wait in-clinic as advised 30 minutes after toradol injection, advised pt to call office PRN.

## 2017-11-29 NOTE — PROGRESS NOTES
"Subjective:       Patient ID: Pilar Flores is a 52 y.o.W/ female.    Chief Complaint: Abdominal Cramping; Flank Pain; and Generalized Body Aches    HPI         She comes in today accompanied by her  and has the above problem.  About 2 weeks ago, she developed some left upper quadrant abdominal discomfort that was about a 4/10 level of pain and it was there for about a few hours and then it disappeared.  She is normally a person that gets up and has a cup coffee and usually has a bowel movement each day.  She has been passing normal bowel movements with exception of this morning she had to strain little bit to get it out.  It was quite firm.  She started over the weekend on Friday with left upper quadrant abdominal pain, colicky that would come and go, slight loss of appetite, worsened with deep breathing-movement of her torso-or coughing which she has done very little of.  She went to work each day this week until today and yesterday at work it got extremely worse and was leveled about an 8/10 level of pain.  She doesn't recall if she did have a bowel movement yesterday or not.  She also had a fever last night 101.4°, body aches and myalgias, both knees ached and lower back ache.        She did have a colonoscopy years ago done in [place] and was told that time that she had ulcerative colitis but she disputed that fact.  Her problems with "colitis" disappeared after she was given medicine to promote medical menopause.  She's thought it was caused by the endometriosis aggravating her bowel and flaring up when she was having her menstrual cycle.  She never was told whether she had diverticulum or colon polyps.  She is not had a repeat colonoscopy since that time.        She denies any problem at present with nausea, anorexia, vomiting, hematemesis, coffee-ground emesis, BR RB, hematochezia, melena, excessive flatus, excessive eructations, distention of her abdomen, sweats, diaphoresis, near syncope.  It " is very hard for her to get up and down from bed.  She has to roll in her right side in order to help her get out of bed and also reverse the process in order to lay back on her back.  It's a very slow process for her to do that due to the discomfort she's having.    Review of Systems    Otherwise negative concerning the PULMONARY, CV, VASCULAR, GI, RENAL, , and GYN system review.    Objective:      Physical Exam    ENT: All essentially WNL but she looks a little dry in the mouth and may be a little dehydrated.  CHEST: Clear BS anterior to posterior with no wheeze, rhonchi, or rales.  She is not in any cardiorespiratory distress this time.  HEART: RSR.  S1 is greater than S2.  Pulse rate is 88 bpm and regular.  She has no peripheral edema.  Her color and warmth are all good at this time.  ABDOMEN: Exogenously obese and somewhat firm but without rigidity.  She is tender diffusely throughout the abdomen but is worse in the left upper quadrant.  She has no distention with air or fluid.  She is nonicteric in both the skin and the eyes.  Bowel sounds are very hypoactive but present.  CBC: She has a 24,000 white count with a left shift in her differential.  CHEM-20: Wasn't done at time of dictation.  ABDOMEN FLAT and UPRIGHT: Normal x-rays without any sign of obstruction.    Assessment:       1. Colicky LUQ abdominal pain    2. Diverticulitis of large intestine without perforation or abscess without bleeding        Plan:     1.  Start on been till 20 mg every 6 hours when necessary abdominal cramping.  2.  Start Cipro 500 mg twice a day ×10 days.  Recheck if symptoms increase or persist.  3.  Diet as tolerated.  She can also take an NSAID or Tylenol to help relieve the abdominal ache and control fever.

## 2017-12-01 ENCOUNTER — TELEPHONE (OUTPATIENT)
Dept: INTERNAL MEDICINE | Facility: CLINIC | Age: 52
End: 2017-12-01

## 2017-12-01 NOTE — LETTER
December 1, 2017      [facility]  [address]  [place] [address]  Phone: [phone]  Fax: [phone]       Patient: Pilar Flores   YOB: 1965  Date of Visit: 11/29/2017    To Whom It May Concern:    Pilar Flores was seen in my office on 11/29/2017. She may return to work on 12/04/2017.     If you have any questions or concerns, or if I can be of further assistance, please do not hesitate to contact me.    Sincerely,      Brenda Blackman CMA, OUMAR Candelario PA-C

## 2018-01-03 DIAGNOSIS — R10.12 COLICKY LUQ ABDOMINAL PAIN: ICD-10-CM

## 2018-01-03 DIAGNOSIS — K57.32 DIVERTICULITIS OF LARGE INTESTINE WITHOUT PERFORATION OR ABSCESS WITHOUT BLEEDING: ICD-10-CM

## 2018-01-04 RX ORDER — DICYCLOMINE HYDROCHLORIDE 20 MG/1
20 TABLET ORAL EVERY 6 HOURS
Qty: 120 TABLET | Refills: 0 | Status: SHIPPED | OUTPATIENT
Start: 2018-01-04 | End: 2018-02-03

## 2018-01-30 RX ORDER — MELOXICAM 7.5 MG/1
7.5 TABLET ORAL DAILY PRN
Qty: 30 TABLET | Refills: 2 | Status: SHIPPED | OUTPATIENT
Start: 2018-01-30 | End: 2018-12-20 | Stop reason: SDUPTHER

## 2018-02-09 ENCOUNTER — PATIENT OUTREACH (OUTPATIENT)
Dept: ADMINISTRATIVE | Facility: HOSPITAL | Age: 53
End: 2018-02-09

## 2018-02-09 NOTE — PROGRESS NOTES
Schedule annual mammogram on 02/28//2018 at 3:45 pm. Patient in agreement and vocalize understanding. A appointment reminder sent in the mail.

## 2018-02-27 ENCOUNTER — TELEPHONE (OUTPATIENT)
Dept: RADIOLOGY | Facility: HOSPITAL | Age: 53
End: 2018-02-27

## 2018-02-28 ENCOUNTER — HOSPITAL ENCOUNTER (OUTPATIENT)
Dept: RADIOLOGY | Facility: HOSPITAL | Age: 53
Discharge: HOME | End: 2018-02-28
Attending: FAMILY MEDICINE
Payer: COMMERCIAL

## 2018-02-28 VITALS — BODY MASS INDEX: 32.11 KG/M2 | HEIGHT: 64 IN | WEIGHT: 188.06 LBS

## 2018-02-28 DIAGNOSIS — R91.1 LUNG NODULE: ICD-10-CM

## 2018-02-28 DIAGNOSIS — Z12.31 ENCOUNTER FOR SCREENING MAMMOGRAM FOR MALIGNANT NEOPLASM OF BREAST: ICD-10-CM

## 2018-02-28 PROCEDURE — 77063 BREAST TOMOSYNTHESIS BI: CPT | Mod: 26,,, | Performed by: RADIOLOGY

## 2018-02-28 PROCEDURE — 77067 SCR MAMMO BI INCL CAD: CPT | Mod: 26,,, | Performed by: RADIOLOGY

## 2018-02-28 PROCEDURE — 71250 CT THORAX DX C-: CPT | Mod: 26,,, | Performed by: RADIOLOGY

## 2018-02-28 PROCEDURE — 77067 SCR MAMMO BI INCL CAD: CPT | Mod: TC,PO

## 2018-02-28 PROCEDURE — 71250 CT THORAX DX C-: CPT | Mod: TC,PO

## 2018-06-01 ENCOUNTER — PATIENT MESSAGE (OUTPATIENT)
Dept: INTERNAL MEDICINE | Facility: CLINIC | Age: 53
End: 2018-06-01

## 2018-06-01 DIAGNOSIS — R91.1 LUNG NODULE: ICD-10-CM

## 2018-06-05 ENCOUNTER — CLINICAL SUPPORT (OUTPATIENT)
Dept: SMOKING CESSATION | Facility: CLINIC | Age: 53
End: 2018-06-05
Payer: COMMERCIAL

## 2018-06-05 DIAGNOSIS — F17.200 NICOTINE DEPENDENCE: Primary | ICD-10-CM

## 2018-06-05 PROCEDURE — 99407 BEHAV CHNG SMOKING > 10 MIN: CPT | Mod: S$GLB,,,

## 2018-06-05 NOTE — PROGRESS NOTES
Successful contact with patient regarding tobacco cessation quit #1. Pt states, she reminded tobacco free for 3 months. Pt's not ready to return to the program at this time. Pt provided with her benefit status and contact information to schedule an appointment when she's ready. Will follow up with her in 3 months.

## 2018-09-26 ENCOUNTER — CLINICAL SUPPORT (OUTPATIENT)
Dept: SMOKING CESSATION | Facility: CLINIC | Age: 53
End: 2018-09-26
Payer: COMMERCIAL

## 2018-09-26 DIAGNOSIS — F17.200 NICOTINE DEPENDENCE: Primary | ICD-10-CM

## 2018-09-26 PROCEDURE — 99407 BEHAV CHNG SMOKING > 10 MIN: CPT | Mod: S$GLB,,,

## 2018-09-26 NOTE — PROGRESS NOTES
Successful contact with patient regarding tobacco cessation quit #1. Pt states, she was unable to become tobacco free, she currently smoke 10 cigarettes per day,and she is not ready to return to the program at this time. Pt informed of her benefit status and contact information to schedule an appointment when she is ready. Will update the tobacco cessation smart form for 12 months on quit #1 and resolve the episode.

## 2018-12-20 RX ORDER — MELOXICAM 7.5 MG/1
7.5 TABLET ORAL DAILY PRN
Qty: 30 TABLET | Refills: 0 | Status: SHIPPED | OUTPATIENT
Start: 2018-12-20 | End: 2019-01-19

## 2019-10-25 DIAGNOSIS — Z12.11 COLON CANCER SCREENING: ICD-10-CM

## 2020-10-05 ENCOUNTER — PATIENT MESSAGE (OUTPATIENT)
Dept: ADMINISTRATIVE | Facility: HOSPITAL | Age: 55
End: 2020-10-05

## 2020-10-06 ENCOUNTER — PATIENT MESSAGE (OUTPATIENT)
Dept: ADMINISTRATIVE | Facility: HOSPITAL | Age: 55
End: 2020-10-06

## 2022-07-12 ENCOUNTER — OFFICE VISIT (OUTPATIENT)
Dept: INTERNAL MEDICINE | Facility: CLINIC | Age: 57
End: 2022-07-12
Payer: COMMERCIAL

## 2022-07-12 VITALS
TEMPERATURE: 98 F | HEART RATE: 85 BPM | HEIGHT: 64 IN | BODY MASS INDEX: 31.02 KG/M2 | DIASTOLIC BLOOD PRESSURE: 88 MMHG | OXYGEN SATURATION: 96 % | WEIGHT: 181.69 LBS | SYSTOLIC BLOOD PRESSURE: 144 MMHG

## 2022-07-12 DIAGNOSIS — F32.1 CURRENT MODERATE EPISODE OF MAJOR DEPRESSIVE DISORDER WITHOUT PRIOR EPISODE: ICD-10-CM

## 2022-07-12 DIAGNOSIS — F17.200 SMOKER: ICD-10-CM

## 2022-07-12 DIAGNOSIS — L40.9 PSORIASIS: ICD-10-CM

## 2022-07-12 DIAGNOSIS — F41.9 ANXIETY: ICD-10-CM

## 2022-07-12 DIAGNOSIS — Z76.89 ENCOUNTER TO ESTABLISH CARE: Primary | ICD-10-CM

## 2022-07-12 DIAGNOSIS — Z12.31 BREAST CANCER SCREENING BY MAMMOGRAM: ICD-10-CM

## 2022-07-12 DIAGNOSIS — I10 PRIMARY HYPERTENSION: ICD-10-CM

## 2022-07-12 DIAGNOSIS — E78.5 HYPERLIPIDEMIA, UNSPECIFIED HYPERLIPIDEMIA TYPE: ICD-10-CM

## 2022-07-12 PROCEDURE — 99999 PR PBB SHADOW E&M-EST. PATIENT-LVL IV: CPT | Mod: PBBFAC,,, | Performed by: NURSE PRACTITIONER

## 2022-07-12 PROCEDURE — 3077F SYST BP >= 140 MM HG: CPT | Mod: CPTII,S$GLB,, | Performed by: NURSE PRACTITIONER

## 2022-07-12 PROCEDURE — 99999 PR PBB SHADOW E&M-EST. PATIENT-LVL IV: ICD-10-PCS | Mod: PBBFAC,,, | Performed by: NURSE PRACTITIONER

## 2022-07-12 PROCEDURE — 3077F PR MOST RECENT SYSTOLIC BLOOD PRESSURE >= 140 MM HG: ICD-10-PCS | Mod: CPTII,S$GLB,, | Performed by: NURSE PRACTITIONER

## 2022-07-12 PROCEDURE — 3008F PR BODY MASS INDEX (BMI) DOCUMENTED: ICD-10-PCS | Mod: CPTII,S$GLB,, | Performed by: NURSE PRACTITIONER

## 2022-07-12 PROCEDURE — 99204 OFFICE O/P NEW MOD 45 MIN: CPT | Mod: S$GLB,,, | Performed by: NURSE PRACTITIONER

## 2022-07-12 PROCEDURE — 3079F PR MOST RECENT DIASTOLIC BLOOD PRESSURE 80-89 MM HG: ICD-10-PCS | Mod: CPTII,S$GLB,, | Performed by: NURSE PRACTITIONER

## 2022-07-12 PROCEDURE — 3079F DIAST BP 80-89 MM HG: CPT | Mod: CPTII,S$GLB,, | Performed by: NURSE PRACTITIONER

## 2022-07-12 PROCEDURE — 1159F MED LIST DOCD IN RCRD: CPT | Mod: CPTII,S$GLB,, | Performed by: NURSE PRACTITIONER

## 2022-07-12 PROCEDURE — 3008F BODY MASS INDEX DOCD: CPT | Mod: CPTII,S$GLB,, | Performed by: NURSE PRACTITIONER

## 2022-07-12 PROCEDURE — 1160F RVW MEDS BY RX/DR IN RCRD: CPT | Mod: CPTII,S$GLB,, | Performed by: NURSE PRACTITIONER

## 2022-07-12 PROCEDURE — 99204 PR OFFICE/OUTPT VISIT, NEW, LEVL IV, 45-59 MIN: ICD-10-PCS | Mod: S$GLB,,, | Performed by: NURSE PRACTITIONER

## 2022-07-12 PROCEDURE — 1160F PR REVIEW ALL MEDS BY PRESCRIBER/CLIN PHARMACIST DOCUMENTED: ICD-10-PCS | Mod: CPTII,S$GLB,, | Performed by: NURSE PRACTITIONER

## 2022-07-12 PROCEDURE — 1159F PR MEDICATION LIST DOCUMENTED IN MEDICAL RECORD: ICD-10-PCS | Mod: CPTII,S$GLB,, | Performed by: NURSE PRACTITIONER

## 2022-07-12 RX ORDER — AMLODIPINE BESYLATE 10 MG/1
10 TABLET ORAL DAILY
Qty: 90 TABLET | Refills: 3 | Status: SHIPPED | OUTPATIENT
Start: 2022-07-12

## 2022-07-12 RX ORDER — BUPROPION HYDROCHLORIDE 150 MG/1
150 TABLET ORAL DAILY
Qty: 30 TABLET | Refills: 11 | Status: SHIPPED | OUTPATIENT
Start: 2022-07-12 | End: 2022-07-26

## 2022-07-12 NOTE — PROGRESS NOTES
Subjective:       Patient ID: Pilar Flores is a 56 y.o. female.    Chief Complaint: Depression    Mrs. Flores presents to visit to [facility] care with multiple complaints.     Several weeks ago, had significant headaches, blurred vision without relief from ASA or tylenol. Blood pressure at home has been between 120-130s/ 90-100s. Has been taking a friend's amlodipine 10 mg for a couple of weeks. Has not taken in a couple of days. Headaches and blurred vision improved since starting medication.     Also has been struggling with depression. Significant other passed away from MI in her home 3 months ago. Has been having difficulty with sleep, and also cries daily. Started new job, and has difficulty with concentrating with work. RN with [employer]. Has no motivation for self or home care.     Depression  Visit Type: initial  Onset of symptoms: 1 to 6 months ago  Progression since onset: unchanged  Patient presents with the following symptoms: anhedonia, decreased concentration, depressed mood, fatigue, insomnia, nervousness/anxiety and shortness of breath (chronic).  Patient is not experiencing: chest pain, confusion, dizziness, malaise, palpitations, panic, suicidal ideas, suicidal planning, thoughts of death, weight gain and weight loss.  Frequency of symptoms: constantly   Severity: severe   Sleep quality: fair  Risk factors: alcohol intake  Patient has a history of: anxiety/panic attacks  No history of: anemia, arrhythmia, CAD, CHF, chronic lung disease and suicide attempt        Patient Active Problem List   Diagnosis    Obesity, Class I, BMI 30.0-34.9 (see actual BMI)    Smoker    Hyperlipidemia    Psoriasis    Anxiety    Current moderate episode of major depressive disorder without prior episode       Family History   Problem Relation Age of Onset    Heart disease Mother     Cancer Father      Past Surgical History:   Procedure Laterality Date    UTERINE FIBROID SURGERY           Current  "Outpatient Medications:     aspirin (ECOTRIN) 81 MG EC tablet, Take 81 mg by mouth once daily., Disp: , Rfl:     halobetasol (ULTRAVATE) 0.05 % cream, Apply topically 2 (two) times daily., Disp: 50 g, Rfl: 2    amLODIPine (NORVASC) 10 MG tablet, Take 1 tablet (10 mg total) by mouth once daily., Disp: 90 tablet, Rfl: 3    buPROPion (WELLBUTRIN XL) 150 MG TB24 tablet, Take 1 tablet (150 mg total) by mouth once daily., Disp: 30 tablet, Rfl: 11    Review of Systems   Constitutional: Positive for activity change, appetite change and fatigue. Negative for chills, fever, weight gain and weight loss.   Eyes: Negative for visual disturbance.   Respiratory: Positive for shortness of breath (chronic). Negative for cough.    Cardiovascular: Negative for chest pain and palpitations.   Gastrointestinal: Negative for abdominal pain, nausea and vomiting.   Neurological: Negative for dizziness, light-headedness and headaches (resolved).   Psychiatric/Behavioral: Positive for decreased concentration, depression and sleep disturbance. Negative for confusion and suicidal ideas. The patient is nervous/anxious and has insomnia.        Objective:   BP (!) 144/88 (BP Location: Left arm, Patient Position: Sitting, BP Method: Medium (Manual))   Pulse 85   Temp 98.1 °F (36.7 °C) (Temporal)   Ht 5' 4" (1.626 m)   Wt 82.4 kg (181 lb 10.5 oz)   SpO2 96%   BMI 31.18 kg/m²      Physical Exam  Vitals reviewed.   Constitutional:       General: She is not in acute distress.     Appearance: Normal appearance. She is well-developed. She is not ill-appearing or diaphoretic.   HENT:      Head: Normocephalic.   Cardiovascular:      Rate and Rhythm: Normal rate and regular rhythm.      Pulses: Normal pulses.      Heart sounds: Normal heart sounds. No murmur heard.    No friction rub. No gallop.   Pulmonary:      Effort: Pulmonary effort is normal. No respiratory distress.      Breath sounds: Normal breath sounds. No rales.   Musculoskeletal:    "      General: Normal range of motion.      Cervical back: Normal range of motion and neck supple.   Skin:     General: Skin is warm and dry.      Coloration: Skin is not pale.      Findings: No erythema.   Neurological:      Mental Status: She is alert and oriented to person, place, and time.   Psychiatric:         Mood and Affect: Mood is depressed. Affect is tearful.         Speech: Speech normal.         Behavior: Behavior is cooperative.         Thought Content: Thought content does not include suicidal ideation. Thought content does not include suicidal plan.         Assessment & Plan     Problem List Items Addressed This Visit        Psychiatric    Anxiety    Current Assessment & Plan     Previously not on medication. Will hope to improve with wellbutrin.            Current moderate episode of major depressive disorder without prior episode    Current Assessment & Plan     Starting on wellbutrin. Discussed grief counseling. Try OTC supplement for sleep for now.            Relevant Medications    buPROPion (WELLBUTRIN XL) 150 MG TB24 tablet       Derm    Psoriasis    Current Assessment & Plan     Stable. No significant flare up. Continue halobetasol.               Cardiac/Vascular    Hyperlipidemia    Current Assessment & Plan     Will check at annual wellness labs at next visit.               Other    Smoker    Current Assessment & Plan     Counseled on the importance of smoke cessation in order to improve overall quality of life and reduce risk of future comorbidities. Declined smoke cessation program.              Relevant Medications    buPROPion (WELLBUTRIN XL) 150 MG TB24 tablet      Other Visit Diagnoses     Encounter to establish care    -  Primary    Breast cancer screening by mammogram        Relevant Orders    Mammo Digital Screening Bilat w/ Black    Primary hypertension        Relevant Medications    amLODIPine (NORVASC) 10 MG tablet        Follow up in about 2 weeks (around 7/26/2022) for  "depression/annual wellness..            Portions of this note may have been created with voice recognition software. Occasional "wrong-word" or "sound-a-like" substitutions may have occurred due to the inherent limitations of voice recognition software. Please, read the note carefully and recognize, using context, where substitutions have occurred.       "

## 2022-07-18 ENCOUNTER — PATIENT MESSAGE (OUTPATIENT)
Dept: ADMINISTRATIVE | Facility: HOSPITAL | Age: 57
End: 2022-07-18
Payer: COMMERCIAL

## 2022-07-19 ENCOUNTER — OFFICE VISIT (OUTPATIENT)
Dept: URGENT CARE | Facility: CLINIC | Age: 57
End: 2022-07-19
Payer: COMMERCIAL

## 2022-07-19 VITALS
TEMPERATURE: 98 F | HEIGHT: 64 IN | SYSTOLIC BLOOD PRESSURE: 119 MMHG | OXYGEN SATURATION: 100 % | RESPIRATION RATE: 18 BRPM | DIASTOLIC BLOOD PRESSURE: 74 MMHG | HEART RATE: 75 BPM | BODY MASS INDEX: 30.73 KG/M2 | WEIGHT: 180 LBS

## 2022-07-19 DIAGNOSIS — U07.1 COVID-19: Primary | ICD-10-CM

## 2022-07-19 DIAGNOSIS — R05.9 COUGH: ICD-10-CM

## 2022-07-19 DIAGNOSIS — R52 GENERALIZED BODY ACHES: ICD-10-CM

## 2022-07-19 DIAGNOSIS — U07.1 COVID-19 VIRUS DETECTED: ICD-10-CM

## 2022-07-19 DIAGNOSIS — J02.9 ACUTE SORE THROAT: ICD-10-CM

## 2022-07-19 LAB
CTP QC/QA: YES
SARS-COV-2 RDRP RESP QL NAA+PROBE: POSITIVE

## 2022-07-19 PROCEDURE — 3074F PR MOST RECENT SYSTOLIC BLOOD PRESSURE < 130 MM HG: ICD-10-PCS | Mod: CPTII,S$GLB,, | Performed by: PHYSICIAN ASSISTANT

## 2022-07-19 PROCEDURE — 1159F MED LIST DOCD IN RCRD: CPT | Mod: CPTII,S$GLB,, | Performed by: PHYSICIAN ASSISTANT

## 2022-07-19 PROCEDURE — U0002: ICD-10-PCS | Mod: QW,S$GLB,, | Performed by: PHYSICIAN ASSISTANT

## 2022-07-19 PROCEDURE — 1160F PR REVIEW ALL MEDS BY PRESCRIBER/CLIN PHARMACIST DOCUMENTED: ICD-10-PCS | Mod: CPTII,S$GLB,, | Performed by: PHYSICIAN ASSISTANT

## 2022-07-19 PROCEDURE — 3008F BODY MASS INDEX DOCD: CPT | Mod: CPTII,S$GLB,, | Performed by: PHYSICIAN ASSISTANT

## 2022-07-19 PROCEDURE — 3008F PR BODY MASS INDEX (BMI) DOCUMENTED: ICD-10-PCS | Mod: CPTII,S$GLB,, | Performed by: PHYSICIAN ASSISTANT

## 2022-07-19 PROCEDURE — 3078F DIAST BP <80 MM HG: CPT | Mod: CPTII,S$GLB,, | Performed by: PHYSICIAN ASSISTANT

## 2022-07-19 PROCEDURE — 99214 PR OFFICE/OUTPT VISIT, EST, LEVL IV, 30-39 MIN: ICD-10-PCS | Mod: S$GLB,,, | Performed by: PHYSICIAN ASSISTANT

## 2022-07-19 PROCEDURE — 3074F SYST BP LT 130 MM HG: CPT | Mod: CPTII,S$GLB,, | Performed by: PHYSICIAN ASSISTANT

## 2022-07-19 PROCEDURE — 1160F RVW MEDS BY RX/DR IN RCRD: CPT | Mod: CPTII,S$GLB,, | Performed by: PHYSICIAN ASSISTANT

## 2022-07-19 PROCEDURE — U0002 COVID-19 LAB TEST NON-CDC: HCPCS | Mod: QW,S$GLB,, | Performed by: PHYSICIAN ASSISTANT

## 2022-07-19 PROCEDURE — 99214 OFFICE O/P EST MOD 30 MIN: CPT | Mod: S$GLB,,, | Performed by: PHYSICIAN ASSISTANT

## 2022-07-19 PROCEDURE — 1159F PR MEDICATION LIST DOCUMENTED IN MEDICAL RECORD: ICD-10-PCS | Mod: CPTII,S$GLB,, | Performed by: PHYSICIAN ASSISTANT

## 2022-07-19 PROCEDURE — 3078F PR MOST RECENT DIASTOLIC BLOOD PRESSURE < 80 MM HG: ICD-10-PCS | Mod: CPTII,S$GLB,, | Performed by: PHYSICIAN ASSISTANT

## 2022-07-19 NOTE — LETTER
[address] [facility] [address] [facility], [address]  [place] [address]  Phone: [phone]          Return to Work/School    Patient: Pilar Flores  YOB: 1965   Date: 07/19/2022     To Whom It May Concern:     Pilar Flores was in contact with/seen in my office on 07/19/2022. COVID-19 is present in our communities across the state. There is limited testing for COVID at this time, so not all patients can be tested. In this situation, your employee meets the following criteria:     Pilar Flores has met the criteria for COVID-19 testing and has a POSITIVE result. She can return to work once they are asymptomatic for 24 hours without the use of fever reducing medications AND at least five days from the start of symptoms (or from the first positive result if they have no symptoms).      If you have any questions or concerns, or if I can be of further assistance, please do not hesitate to contact me.     Sincerely,    Desiree Kenyon PA-C

## 2022-07-19 NOTE — PROGRESS NOTES
"Subjective:       Patient ID: Pilar Flores is a 56 y.o. female.    Vitals:  height is 5' 4" (1.626 m) and weight is 81.6 kg (180 lb). Her oral temperature is 98.3 °F (36.8 °C). Her blood pressure is 119/74 and her pulse is 75. Her respiration is 18 and oxygen saturation is 100%.     Chief Complaint: Cough    Pt c/o flu like symptoms, muscle and joint pain, cough (clear mucus), fatigue, low grade fever, chills, diarrhea, chest and nasal congestion, headache starting Sunday.      Cough  This is a new problem. The current episode started yesterday. The problem has been unchanged. The problem occurs every few minutes. The cough is productive of sputum. Associated symptoms include chills, a fever, headaches, myalgias, nasal congestion, rhinorrhea, a sore throat and shortness of breath. Pertinent negatives include no chest pain, ear congestion, ear pain, heartburn, hemoptysis, postnasal drip, rash, sweats, weight loss or wheezing. Nothing aggravates the symptoms. Treatments tried: tylenol. The treatment provided no relief. There is no history of asthma, bronchiectasis, bronchitis, COPD, emphysema, environmental allergies or pneumonia.       Constitution: Positive for chills and fever.   HENT: Positive for sore throat. Negative for ear pain and postnasal drip.    Cardiovascular: Negative for chest pain.   Respiratory: Positive for cough and shortness of breath. Negative for bloody sputum and wheezing.    Gastrointestinal: Negative for heartburn.   Musculoskeletal: Positive for muscle ache.   Skin: Negative for rash.   Allergic/Immunologic: Negative for environmental allergies.   Neurological: Positive for headaches.       Objective:       Vitals:    07/19/22 1142   BP: 119/74   Pulse: 75   Resp: 18   Temp: 98.3 °F (36.8 °C)   TempSrc: Oral   SpO2: 100%   Weight: 81.6 kg (180 lb)   Height: 5' 4" (1.626 m)       Physical Exam   Constitutional: She is oriented to person, place, and time. She appears well-developed. She is " cooperative.  Non-toxic appearance. She appears ill. No distress.   HENT:   Head: Normocephalic and atraumatic. Head is without raccoon's eyes and without Pisano's sign.   Ears:   Right Ear: Hearing and external ear normal. No drainage.   Left Ear: Hearing and external ear normal. No drainage.   Nose: Mucosal edema, rhinorrhea and congestion present.   Mouth/Throat: Uvula is midline and mucous membranes are normal. Mucous membranes are moist. Posterior oropharyngeal edema present. No posterior oropharyngeal erythema. Tonsils are 0 on the right. Tonsils are 0 on the left. No tonsillar exudate. Oropharynx is clear.   PND      Comments: PND  Eyes: Conjunctivae and EOM are normal. Pupils are equal, round, and reactive to light. No scleral icterus. Extraocular movement intact vision grossly intact gaze aligned appropriately   Neck: Neck supple. No Brudzinski's sign and no Kernig's sign noted. muscular tenderness present.   Cardiovascular: Normal rate, regular rhythm, normal heart sounds and normal pulses.   Pulmonary/Chest: Effort normal and breath sounds normal. No accessory muscle usage. No respiratory distress. She has no decreased breath sounds. She has no wheezes. She has no rhonchi. She has no rales.   Musculoskeletal:      Right lower leg: No edema.      Left lower leg: No edema.      Comments: Malaise (generalized)   Neurological: no focal deficit. She is alert, oriented to person, place, and time and at baseline. She displays no dysarthria. No cranial nerve deficit. Gait normal.   Skin: Skin is warm, dry, not diaphoretic and no rash. Capillary refill takes less than 2 seconds.   Psychiatric: Her speech is normal and behavior is normal. Judgment and thought content normal.   Nursing note and vitals reviewed.        Assessment:       1. COVID-19    2. Cough    3. Acute sore throat    4. Generalized body aches        Results for orders placed or performed in visit on 07/19/22   POCT COVID-19 Rapid Screening    Result Value Ref Range    POC Rapid COVID Positive (A) Negative     Acceptable Yes        covid score 2    Plan:         COVID-19    Cough  -     POCT COVID-19 Rapid Screening    Acute sore throat    Generalized body aches           Medical Decision Making:   Urgent Care Management:  Not interested in COVID EUA therapy with COVID risk score 2.    COVID DISCUSSIONS/INSTRUCTIONS:   - Advised patient to stay home and self quarantine per the latest CDC guidelines   - Educated patient regarding medications for symptomatic relief (outlined below).  - Strict ED precautions given for any emergent symptoms.    I have discussed the diagnosis, treatment plan and recommendations for follow-up with primary care, and patient verbalized understanding and is agreeable to the plan.   AVS printed and given to patient upon discharge with information regarding this visit. All questions were addressed prior to discharge.            Patient Instructions   YOU HAVE TESTED POSITIVE FOR COVID-19 TODAY!  1. ISOLATION:  you must isolate for 5 days starting on the day of the first symptoms,  not the day of the positive test.    This is the most important part, both the CDC and the [facility] emphasize that you do not test out of isolation.    After 5 days, if your symptoms have improved and you have not had fever on day 5, you can return to the community on day 6- NO TESTING REQUIRED!      In fact, we do not retest if you were positive in the last 90 days.     After your 5 days of isolation are completed, the CDC recommends strict mask use for the first 5 days that you come out of isolation.    2. DISCUSSION/INSTRUCTIONS:    *You should treat any symptoms as we discussed.    *If you have difficulty breathing, shortness of breath, chest pain, high fevers that are not controlled with Tylenol and Ibuprofen, or any further emergency concerns, go to the ER.     VIRAL URI: OVER THE COUNTER RECOMMENDATIONS/SUGGESTIONS--if needed  Covid is a  virus and does NOT respond to antibiotics!      Make sure to stay well hydrated.    Use Nasal Saline to mechanically move any post nasal drip from your ear tube or from the back of your throat. OR You may insert a whole garlic cloves into your nostrils and leave for 10-15 minutes. When you remove them, mucus will be pulled down. This may burn as garlic is strong.  Repeat as often as needed and able to tolerate.    Please do not use garlic if you have an allergy to garlic.    Use hot salt water gargles or gargle diluted hydrogen peroxide to ease your throat pain.   1/2 tsp salt to 1 cup warm water, gargle as desired.  Follow directions on the back of hydrogen peroxide bottle for gargles.      Use pseudoephedrine (behind the counter) to decongest. Pseudoephedrine  30 mg up to 240 mg /day. *BE AWARE- It can raise your blood pressure and give you palpitations.  Use mucinex (guaifenisin) to break up mucous up to 2400mg/day to loosen any mucous.   The mucinex DM pill has a cough suppressant that can be sedating. It can be used at night to stop the tickle at the back of your throat.  You can use Mucinex D (it has guaifenesin and a high dose of pseudoephedrine) in the mornings to help decongest.    Sometimes Nyquil at night is beneficial to help you get some rest, however it is sedating and it does have an antihistamine and tylenol.    Honey is a natural cough suppressant that can be used.    Tylenol up to 4,000 mg a day is safe for short periods and can be used for headache, body aches, pain, and fever. However in high doses and prolonged use it can cause liver irritation.    Ibuprofen is a non-steroidal anti-inflammatory that can be used for headache, body aches, pain, and fever.However it can also cause stomach irritation if over used.    Flonase-How do you use a Nasal Spray?    Make sure you understand your dosing instructions. Spray only the number of prescribed sprays in each nostril. Read the package instructions  before using your spray the first time.    Most sprays suggest the following steps:    Wash your hands well.    Gently blow your nose to clear the passageway.    Shake the container several times.    Tilt your head slightly downward.  Use the opposite hand from the nostril you will be spraying to hold the spray bottle.    Block one nostril with your finger.  Insert the nasal applicator into the other nostril.    Aim the spray toward the outer wall of the nostril.  Inhale slowly through the nose and press the .    Breathe out and repeat to apply the prescribed number of sprays.  Repeat these steps for the other nostril.     Avoid sneezing or blowing your nose right after spraying.             *WHAT DO I TELL MY KNOWN EXPOSURES/CLOSE CONTACTS?:     CDC Testing and Quarantine Guidelines for patients with exposure to a known-positive COVID-19 person:    *A 'close exposure' is defined as anyone who has had an exposure (masked or unmasked) to a known COVID -19 positive person within 6 feet of someone for a cumulative total of 15 minutes or more over a 24-hour period.    - Vaccinated/Have been boosted or completed the primary series of Pfizer or Moderna vaccine within the last 6 months or completed the primary series of J&J vaccine within the last 2 months and/or had a positive test within 90 days-- do NOT need to quarantine after contact with someone who had COVID-19 unless they have symptoms.    - Fully vaccinated people who have not had a positive test within 90 days, should get tested 3-5 days after their exposure, even if they don't have symptoms and wear a mask indoors in public for 10 days following exposure or until their test result is negative on day 5. If you develop symptoms test and quarantine.    - Unvaccinated, or are more than six months out from their second mRNA dose (or more than 2 months after the J&J vaccine) and not yet boosted,  and/or NOT had a positive test within 90 days and meet  "'close exposure-- you are required by CDC guidelines to quarantine for at least 5 days from time of exposure followed by 5 days of strict masking. It is recommended, but not required to test after 5 days, unless you develop symptoms, in which case you should test at that time.    *If you do decide to test at 5 days and are asymptomatic, the risk is that if you test without symptoms on Day 5 for example) and you are positive, your 5 day isolation begins on that day, and you turned your 5 day quarantine into 10 days.    *If your exposure does not meet the above definition, you can return to your normal daily activities to include social distancing, wearing a mask and frequent handwashing.    *Alternatively, if a 5-day quarantine is not feasible, it is imperative that an exposed person wear a well-fitting mask at all times when around others for 10 days after exposure!       - You must understand that you have received an Urgent Care treatment only and that you may be released before all of your medical problems are known or treated.   - You, the patient, will arrange for follow up care as instructed with your primary care provider or recommended specialist.   - If your condition worsens or fails to improve we recommend that you receive another evaluation at the ER immediately or contact your PCP to discuss your concerns, or return here.   - Please do not drive or make any important decisions for 24 hours if you have received any pain medications, sedatives or mood altering drugs during your visit.    Disclaimer: This document was drafted with the use of a voice recognition device and is likely to have sound alike errors.         Patient Education        COVID-19 Overview     The Basics   Written by the doctors and editors at [facility]     What is COVID-19?   COVID-19 stands for "coronavirus disease 2019." It is caused by a virus called SARS-CoV-2. The virus first appeared in late 2019 and quickly spread around the " "world.    What are the symptoms of COVID-19?   Symptoms usually start 4 or 5 days after a person is infected with the virus. But in some people, it can take up to 2 weeks for symptoms to appear. Some people never show symptoms at all.  When symptoms do happen, they can include:  · Fever  · Cough  · Trouble breathing  · Feeling tired  · Shaking chills  · Muscle aches  · Headache  · Sore throat  · Problems with sense of smell or taste  Some people have digestive problems like nausea or diarrhea. There have also been some reports of rashes or other skin symptoms. For example, some people with COVID-19 get reddish-purple spots on their fingers or toes. But it's not clear why or how often this happens.  For most people, symptoms will get better within a few weeks. But a small number of people get very sick and stop being able to breathe on their own. In severe cases, their organs stop working, which can lead to death.  Some people with COVID-19 continue to have some symptoms for weeks or months. This seems to be more likely in people who are sick enough to need to stay in the hospital. But this can also happen in people who did not get very sick. Doctors are still learning about the long-term effects of COVID-19.  While children can get COVID-19, they are less likely than adults to have severe symptoms. More information about COVID-19 and children is available separately. (See "Patient education: COVID-19 and children (The Basics)".)    Am I at risk for getting seriously ill?   It depends on your age and health. In some people, COVID-19 leads to serious problems like pneumonia, not getting enough oxygen, heart problems, or even death. This risk gets higher as people get older. It is also higher in people who have other health problems like serious heart disease, chronic kidney disease, type 2 diabetes, chronic obstructive pulmonary disease (COPD), sickle cell disease, or obesity. People who have a weak immune system for " "other reasons (for example, HIV infection or certain medicines), asthma, cystic fibrosis, type 1 diabetes, or high blood pressure might also be at higher risk for serious problems.    How is COVID-19 spread?   The virus that causes COVID-19 mainly spreads from person to person. This usually happens when an infected person coughs, sneezes, or talks near other people. The virus is passed through tiny particles from the infected person's lungs and airway. These particles can easily travel through the air to other people who are nearby. In some cases, like in indoor spaces where the same air keeps being blown around, virus in the particles might be able to spread to other people who are farther away.  The virus can be passed easily between people who live together. But it can also spread at gatherings where people are talking close together, shaking hands, hugging, sharing food, or even singing together. Eating at restaurants raises the risk of infection, since people tend to be close to each other and not covering their faces. Doctors also think it is possible to get infected if you touch a surface that has the virus on it and then touch your mouth, nose, or eyes. However, this is probably not very common.  A person can be infected, and spread the virus to others, even without having any symptoms.    Are there different variants of the virus that causes COVID-19?   Yes. Viruses constantly change or "mutate." When this happens, a new strain or "variant" can form. Most of the time, new variants do not change the way a virus works. But when a variant has changes in important parts of the virus, it can act differently.  Experts have discovered several new variants of the virus that causes COVID-19. Certain variants seem to spread more easily than the original virus. They might also make people sicker.  Experts are studying the different variants. This will help them better understand how far they have spread, whether they " "affect people differently, and how well different vaccines protect against them.  The more people who get vaccinated against COVID-19, the harder it will be for the virus to form new variants.    Is there a test for the virus that causes COVID-19?   Yes. If your doctor or nurse suspects you have COVID-19, they might take a swab from inside your nose or mouth for testing. In some cases, they might take a sample of your saliva. These tests can help your doctor figure out if you have COVID-19 or another illness.  There are 2 types of tests used to diagnose COVID-19:  · Molecular tests - These look for the genetic material from the virus. They are also called "nucleic acid tests." You can get a molecular test at a doctor's office, clinic, or pharmacy. There are also places that make these tests available for lots of people, often at drive-through locations. Depending on the lab, it can take up to several days to get test results back.  Molecular tests are the best way to know if a person has COVID-19. That's because they can detect even very low levels of virus in the body.  · Antigen tests - These look for proteins from the virus. They can give results faster than most molecular tests. You can do an antigen test at a doctor's office, clinic, pharmacy, or through some organizations that make testing available in other places. You can also do an antigen test at home.  Antigen tests are not as accurate as molecular tests. They are more likely to give "false negative" results. This is when the test comes back negative even though the person actually is infected. But antigen tests can still be useful in some situations, when results are needed quickly or a molecular test is not available. For example, if a person has early symptoms of COVID-19, an antigen test can be accurate enough to detect virus in their body. If a person gets an antigen test and the result is negative, a molecular test might be needed to confirm they do " "not have the virus in their body. This might be done if the person has symptoms or knows they were exposed the virus.  There is also a blood test that can show if a person has had COVID-19 in the past. This is called an "antibody" test. Antibody tests are generally not used on their own to diagnose COVID-19 or make decisions about care. But experts can use them to learn how many people in a certain area were infected without knowing it.    Can COVID-19 be prevented?   The best way to prevent COVID-19 is to get vaccinated. In the [place], the first vaccines became available in late 2020. People age 12 and older can get a vaccine.  If enough people get the vaccine, the virus will stop spreading so quickly. More information about COVID-19 vaccines, including what you can do after being vaccinated, is available separately. (See "Patient education: COVID-19 vaccines (The Basics)".)  Experts believe that vaccines will be one of the most important ways to control the COVID-19 pandemic. People who are fully vaccinated are at much lower risk of getting the virus.  If you are not yet vaccinated, there are other ways to help protect yourself and others:  · Practice "social distancing." It's most important to avoid contact with people who are sick. But social distancing also means staying at least 6 feet (about 2 meters) from anyone outside your household. That's because the virus can spread easily through close contact, and it's not always possible to know who is infected.  · Wear a face mask when you need to go be in public around other people. This is mostly so that if you are infected, even if you don't have any symptoms, you are less likely to spread the infection to other people. It might also help protect you from others who could be infected. Make sure your mask covers your mouth and nose.  You can buy cloth masks and disposable (non-medical) masks in stores or online. Cloth masks work best if they have several " layers of fabric. Your mask should fit snugly over your face with no gaps. You can improve the fit by using a mask with an adjustable nose wire, adjusting or knotting the ear loops to make it tighter, or wearing a cloth mask on top of a disposable mask.  When you take your mask off, make sure you do not touch your eyes, nose, or mouth. And wash your hands after you touch the mask. You can wash cloth masks with the rest of your laundry.  When you are outdoors and not around other people, you might not need to wear a mask. But it's important to know what the rules are in your area. The [place] Centers for Disease Control and Prevention (CDC) has more information about how to wear a face mask: www.cdc.gov/coronavirus/2019-ncov/prevent-getting-sick/about-face-coverings.html.  · Wash your hands with soap and water often. This is especially important after being out in public or touching surfaces that many other people also touch, like door handles or railings. The risk of getting infected by touching items like this is probably not very high. Still, it's a good idea to wash your hands often. This also helps protect you from other illnesses, like the flu or the common cold.  Make sure to rub your hands with soap for at least 20 seconds, cleaning your wrists, fingernails, and in between your fingers. Then rinse your hands and dry them with a paper towel you can throw away. If you are not near a sink, you can use a hand sanitizing gel to clean your hands. The gels with at least 60 percent alcohol work the best. But it is better to wash with soap and water if you can.  · Avoid touching your face, especially your mouth, nose, and eyes.  · Avoid or limit traveling if you can. Any form of travel, especially if you spend time in crowded places like airports, increases your risk of getting and spreading infection.  If you do need to travel, be sure to check whether there are any rules about COVID-19 in the area you are  "visiting. In the [place], some places require people to "self-quarantine" for some length of time if they are visiting (or returning) from another state. This means not going out in public or being around other people. The [place] also requires a negative COVID-19 test for anyone who enters, or returns to, the country. Many other countries have testing requirements for visiting, too. All of these rules are meant to help slow the spread of COVID-19.  Once you are fully vaccinated, you are much less likely to get the virus. "Fully vaccinated" means you have had all doses of the vaccine and it has been at least 2 weeks since the last dose. (If you had a single-dose vaccine, you are fully vaccinated 2 weeks after you get the shot.)    What should I do if I have symptoms?   If you have a fever, cough, trouble breathing, or other symptoms of COVID-19, call your doctor or nurse. They will ask about your symptoms. They might also ask about any recent travel and whether you have been around anyone who might have been infected. Then they can tell you if you should come in or go somewhere else to be tested.  If your symptoms are not severe, it is best to call before you go in. The staff can tell you what to do and whether you need to be seen in person. Many people with only mild symptoms should stay home and avoid other people until they get better. If you do need to go to the clinic or hospital, be sure to wear a mask. This helps protect other people. The staff might also have you wait someplace away from other people.  If you are severely ill and need to go to the clinic or hospital right away, you should still call ahead if possible. This way the staff can care for you while taking steps to protect others. If you think you are having a medical emergency, call for an ambulance (in the [place] and [place], dial 9-1-1).  What if I feel fine but think I was exposed?   If you think you were in close contact with someone " with COVID-19, what to do next depends on whether you have already had COVID-19 or gotten the vaccine:  · If you have not had COVID-19 or gotten the vaccine - You should get tested after a possible exposure, even if you don't have any symptoms. Call your doctor or nurse if you aren't sure where to get a test. Then self-quarantine at home and monitor yourself for symptoms. This means staying home as much as possible, and staying at least 6 feet (2 meters) away from other people in your home.  The safest thing to do after a possible exposure is to self-quarantine for 14 days. This can be challenging with work, school, or other responsibilities. Because of this, some public health departments might allow people to stop quarantining sooner, especially if they get a negative test. If you're not sure how long to quarantine for, contact your local public health office or ask your doctor or nurse.  · If you have had COVID-19 or gotten the vaccine - If you had COVID-19 within the last 3 months, you do not need to self-quarantine. If you had COVID-19 but it was more than 3 months ago, follow the steps above.  If you are fully vaccinated, you do not need to self-quarantine. But you should still get tested 3 to 5 days after you were in contact with the person who had COVID-19. Even though you are much less likely to get the infection after being vaccinated, it is still possible.  If you self-quarantine for less than 14 days, or if you do not need to self-quarantine, you should still monitor yourself for symptoms for the full 14 days. If you start to have any symptoms, call your doctor or nurse right away. You should also be extra careful about wearing a mask and social distancing during this time.  How is COVID-19 treated?   Many people will be able to stay home while they get better. But people with serious symptoms or other health problems might need to go to the hospital.  · Mild illness - Mild illness means you might have  "symptoms like fever and cough, but you do not have trouble breathing. Most people with COVID-19 have mild illness and can rest at home until they get better. This usually takes about 2 weeks, but it's not the same for everyone.  If you are recovering from COVID-19, it's important to stay home and "self-isolate" until your doctor or nurse tells you it's safe to stop. Self-isolation means staying apart from other people, even the people you live with. When you can stop self-isolation will depend on how long it has been since you had symptoms, and in some cases, whether you have had a negative test (showing that the virus is no longer in your body).  · Severe illness - If you have more severe illness with trouble breathing, you might need to stay in the hospital, possibly in the intensive care unit (also called the "ICU"). While you are there, you will most likely be in a special isolation room. Only medical staff will be allowed in the room, and they will have to wear special gowns, gloves, masks, and eye protection.  The doctors and nurses can monitor and support your breathing and other body functions and make you as comfortable as possible. You might need extra oxygen to help you breathe easily. If you are having a very hard time breathing, you might need a breathing tube. The tube goes down your throat and into your lungs. It is connected to a machine to help you breathe, called a "ventilator."  Doctors are studying several possible treatments for COVID-19. In certain cases, they might recommend treatments called "monoclonal antibodies." These treatments seem to help some people who are at risk of getting severely ill.  Doctors also might recommend being part of a clinical trial. A clinical trial is a scientific study that tests new medicines to see how well they work. Do not try any new medicines or treatments without talking to a doctor.    What should I do if someone in my home has COVID-19?   If someone in " "your home has COVID-19, there are additional things you can do to protect yourself and others:  · Keep the sick person away from others - The sick person should stay in a separate room, and use a different bathroom if possible. They should also eat in their own room.  Experts also recommend that the person stay away from pets in the house until they are better.  1. Have them wear a mask - The sick person should wear a mask when they are in the same room as other people. If they can't wear a mask, you can help protect yourself by covering your face when you are in the room with them.  2. Wash hands - Wash your hands with soap and water often.  3. Clean often - Here are some specific things that can help:  ? Wear disposable gloves when you clean. It's also a good idea to wear gloves when you have to touch the sick person's laundry, dishes, utensils, or trash. Wash your hands after removing your gloves.  ? Regularly clean things that are touched a lot. This includes counters, bedside tables, doorknobs, computers, phones, and bathroom surfaces.  ? Clean things in your home with soap and water, but also use disinfectants on appropriate surfaces. Some cleaning products work well to kill bacteria, but not viruses, so it's important to check labels. The [place] Environmental Protection Agency (EPA) has a list of products here: www.epa.gov/pesticide-registration/list-n-disinfectants-use-against-sars-cov-2.    What if I am pregnant?   More information about COVID-19 and pregnancy is available separately. (See "Patient education: COVID-19 and pregnancy (The Basics)".)  If you are pregnant and you have questions about COVID-19, talk to your doctor, nurse, or midwife. They can help.    What can I do to cope with stress and anxiety?   It's normal to feel anxious or worried about COVID-19. It's also normal to feel stressed, lonely, or tired of not being able to do your usual activities. You can take care of yourself by " "trying to:  · Take breaks from the news  · Get regular exercise and eat healthy foods  · Find activities that you enjoy and can do at home  · Stay in touch with your friends and family members  It might help to remember that by doing things like getting vaccinated and following local guidelines, you are helping to protect other people in your community.    Where can I go to learn more?   As we learn more about this virus, expert recommendations will continue to change. Check with your doctor or public health official to get the most updated information about how to protect yourself and others.  For information about COVID-19 in your area, you can call your local public health office. In the [place], this usually means your city or town's Board of Health. Many states also have a "hotline" phone number you can call.  You can find more information about COVID-19 at the following websites:  · [place] Centers for Disease Control and Prevention (CDC): www.cdc.gov/COVID19  · World Health Organization (WHO): www.who.int/emergencies/diseases/novel-coronavirus-2019  All topics are updated as new evidence becomes available and our peer review process is complete.    This topic retrieved from [employer] on: Oct 28, 2021.  Topic 018651 Version 67.0  Release: 29.4.2 - C29.263  © 2021 [employer] and/or its affiliates. All rights reserved.  Consumer Information Use and Disclaimer   This information is not specific medical advice and does not replace information you receive from your health care provider. This is only a brief summary of general information. It does NOT include all information about conditions, illnesses, injuries, tests, procedures, treatments, therapies, discharge instructions or life-style choices that may apply to you. You must talk with your health care provider for complete information about your health and treatment options. This information should not be used to decide whether or not to accept " your health care provider's advice, instructions or recommendations. Only your health care provider has the knowledge and training to provide advice that is right for you. The use of this information is governed by the [employer] End User License Agreement, available at https://www.[employer]/en/solutions/Shmo[employer]/about/charles.The use of [employer] content is governed by the [employer] Terms of Use. ©2021 [employer]. All rights reserved.  Copyright   © 2021 [employer] and/or its affiliates. All rights reserved.

## 2022-07-19 NOTE — PATIENT INSTRUCTIONS
YOU HAVE TESTED POSITIVE FOR COVID-19 TODAY!  ISOLATION:  you must isolate for 5 days starting on the day of the first symptoms,  not the day of the positive test.    This is the most important part, both the CDC and the [facility] emphasize that you do not test out of isolation.    After 5 days, if your symptoms have improved and you have not had fever on day 5, you can return to the community on day 6- NO TESTING REQUIRED!      In fact, we do not retest if you were positive in the last 90 days.     After your 5 days of isolation are completed, the CDC recommends strict mask use for the first 5 days that you come out of isolation.    2. DISCUSSION/INSTRUCTIONS:    *You should treat any symptoms as we discussed.    *If you have difficulty breathing, shortness of breath, chest pain, high fevers that are not controlled with Tylenol and Ibuprofen, or any further emergency concerns, go to the ER.     VIRAL URI: OVER THE COUNTER RECOMMENDATIONS/SUGGESTIONS--if needed  Covid is a virus and does NOT respond to antibiotics!      Make sure to stay well hydrated.    Use Nasal Saline to mechanically move any post nasal drip from your ear tube or from the back of your throat. OR You may insert a whole garlic cloves into your nostrils and leave for 10-15 minutes. When you remove them, mucus will be pulled down. This may burn as garlic is strong.  Repeat as often as needed and able to tolerate.    Please do not use garlic if you have an allergy to garlic.    Use hot salt water gargles or gargle diluted hydrogen peroxide to ease your throat pain.   1/2 tsp salt to 1 cup warm water, gargle as desired.  Follow directions on the back of hydrogen peroxide bottle for gargles.      Use pseudoephedrine (behind the counter) to decongest. Pseudoephedrine  30 mg up to 240 mg /day. *BE AWARE- It can raise your blood pressure and give you palpitations.  Use mucinex (guaifenisin) to break up mucous up to 2400mg/day to loosen any mucous.   The mucinex  DM pill has a cough suppressant that can be sedating. It can be used at night to stop the tickle at the back of your throat.  You can use Mucinex D (it has guaifenesin and a high dose of pseudoephedrine) in the mornings to help decongest.    Sometimes Nyquil at night is beneficial to help you get some rest, however it is sedating and it does have an antihistamine and tylenol.    Honey is a natural cough suppressant that can be used.    Tylenol up to 4,000 mg a day is safe for short periods and can be used for headache, body aches, pain, and fever. However in high doses and prolonged use it can cause liver irritation.    Ibuprofen is a non-steroidal anti-inflammatory that can be used for headache, body aches, pain, and fever.However it can also cause stomach irritation if over used.    Flonase-How do you use a Nasal Spray?    Make sure you understand your dosing instructions. Spray only the number of prescribed sprays in each nostril. Read the package instructions before using your spray the first time.    Most sprays suggest the following steps:    Wash your hands well.    Gently blow your nose to clear the passageway.    Shake the container several times.    Tilt your head slightly downward.  Use the opposite hand from the nostril you will be spraying to hold the spray bottle.    Block one nostril with your finger.  Insert the nasal applicator into the other nostril.    Aim the spray toward the outer wall of the nostril.  Inhale slowly through the nose and press the .    Breathe out and repeat to apply the prescribed number of sprays.  Repeat these steps for the other nostril.     Avoid sneezing or blowing your nose right after spraying.             *WHAT DO I TELL MY KNOWN EXPOSURES/CLOSE CONTACTS?:     CDC Testing and Quarantine Guidelines for patients with exposure to a known-positive COVID-19 person:    *A 'close exposure' is defined as anyone who has had an exposure (masked or unmasked) to a  known COVID -19 positive person within 6 feet of someone for a cumulative total of 15 minutes or more over a 24-hour period.    - Vaccinated/Have been boosted or completed the primary series of Pfizer or Moderna vaccine within the last 6 months or completed the primary series of J&J vaccine within the last 2 months and/or had a positive test within 90 days-- do NOT need to quarantine after contact with someone who had COVID-19 unless they have symptoms.    - Fully vaccinated people who have not had a positive test within 90 days, should get tested 3-5 days after their exposure, even if they don't have symptoms and wear a mask indoors in public for 10 days following exposure or until their test result is negative on day 5. If you develop symptoms test and quarantine.    - Unvaccinated, or are more than six months out from their second mRNA dose (or more than 2 months after the J&J vaccine) and not yet boosted,  and/or NOT had a positive test within 90 days and meet 'close exposure-- you are required by CDC guidelines to quarantine for at least 5 days from time of exposure followed by 5 days of strict masking. It is recommended, but not required to test after 5 days, unless you develop symptoms, in which case you should test at that time.    *If you do decide to test at 5 days and are asymptomatic, the risk is that if you test without symptoms on Day 5 for example) and you are positive, your 5 day isolation begins on that day, and you turned your 5 day quarantine into 10 days.    *If your exposure does not meet the above definition, you can return to your normal daily activities to include social distancing, wearing a mask and frequent handwashing.    *Alternatively, if a 5-day quarantine is not feasible, it is imperative that an exposed person wear a well-fitting mask at all times when around others for 10 days after exposure!       - You must understand that you have received an Urgent Care treatment only and that  "you may be released before all of your medical problems are known or treated.   - You, the patient, will arrange for follow up care as instructed with your primary care provider or recommended specialist.   - If your condition worsens or fails to improve we recommend that you receive another evaluation at the ER immediately or contact your PCP to discuss your concerns, or return here.   - Please do not drive or make any important decisions for 24 hours if you have received any pain medications, sedatives or mood altering drugs during your visit.    Disclaimer: This document was drafted with the use of a voice recognition device and is likely to have sound alike errors.         Patient Education        COVID-19 Overview     The Basics   Written by the doctors and editors at [facility]     What is COVID-19?   COVID-19 stands for "coronavirus disease 2019." It is caused by a virus called SARS-CoV-2. The virus first appeared in late 2019 and quickly spread around the world.    What are the symptoms of COVID-19?   Symptoms usually start 4 or 5 days after a person is infected with the virus. But in some people, it can take up to 2 weeks for symptoms to appear. Some people never show symptoms at all.  When symptoms do happen, they can include:  Fever  Cough  Trouble breathing  Feeling tired  Shaking chills  Muscle aches  Headache  Sore throat  Problems with sense of smell or taste  Some people have digestive problems like nausea or diarrhea. There have also been some reports of rashes or other skin symptoms. For example, some people with COVID-19 get reddish-purple spots on their fingers or toes. But it's not clear why or how often this happens.  For most people, symptoms will get better within a few weeks. But a small number of people get very sick and stop being able to breathe on their own. In severe cases, their organs stop working, which can lead to death.  Some people with COVID-19 continue to have some symptoms for " "weeks or months. This seems to be more likely in people who are sick enough to need to stay in the hospital. But this can also happen in people who did not get very sick. Doctors are still learning about the long-term effects of COVID-19.  While children can get COVID-19, they are less likely than adults to have severe symptoms. More information about COVID-19 and children is available separately. (See "Patient education: COVID-19 and children (The Basics)".)    Am I at risk for getting seriously ill?   It depends on your age and health. In some people, COVID-19 leads to serious problems like pneumonia, not getting enough oxygen, heart problems, or even death. This risk gets higher as people get older. It is also higher in people who have other health problems like serious heart disease, chronic kidney disease, type 2 diabetes, chronic obstructive pulmonary disease (COPD), sickle cell disease, or obesity. People who have a weak immune system for other reasons (for example, HIV infection or certain medicines), asthma, cystic fibrosis, type 1 diabetes, or high blood pressure might also be at higher risk for serious problems.    How is COVID-19 spread?   The virus that causes COVID-19 mainly spreads from person to person. This usually happens when an infected person coughs, sneezes, or talks near other people. The virus is passed through tiny particles from the infected person's lungs and airway. These particles can easily travel through the air to other people who are nearby. In some cases, like in indoor spaces where the same air keeps being blown around, virus in the particles might be able to spread to other people who are farther away.  The virus can be passed easily between people who live together. But it can also spread at gatherings where people are talking close together, shaking hands, hugging, sharing food, or even singing together. Eating at restaurants raises the risk of infection, since people tend to be " "close to each other and not covering their faces. Doctors also think it is possible to get infected if you touch a surface that has the virus on it and then touch your mouth, nose, or eyes. However, this is probably not very common.  A person can be infected, and spread the virus to others, even without having any symptoms.    Are there different variants of the virus that causes COVID-19?   Yes. Viruses constantly change or "mutate." When this happens, a new strain or "variant" can form. Most of the time, new variants do not change the way a virus works. But when a variant has changes in important parts of the virus, it can act differently.  Experts have discovered several new variants of the virus that causes COVID-19. Certain variants seem to spread more easily than the original virus. They might also make people sicker.  Experts are studying the different variants. This will help them better understand how far they have spread, whether they affect people differently, and how well different vaccines protect against them.  The more people who get vaccinated against COVID-19, the harder it will be for the virus to form new variants.    Is there a test for the virus that causes COVID-19?   Yes. If your doctor or nurse suspects you have COVID-19, they might take a swab from inside your nose or mouth for testing. In some cases, they might take a sample of your saliva. These tests can help your doctor figure out if you have COVID-19 or another illness.  There are 2 types of tests used to diagnose COVID-19:  Molecular tests - These look for the genetic material from the virus. They are also called "nucleic acid tests." You can get a molecular test at a doctor's office, clinic, or pharmacy. There are also places that make these tests available for lots of people, often at drive-through locations. Depending on the lab, it can take up to several days to get test results back.  Molecular tests are the best way to know if a " "person has COVID-19. That's because they can detect even very low levels of virus in the body.  Antigen tests - These look for proteins from the virus. They can give results faster than most molecular tests. You can do an antigen test at a doctor's office, clinic, pharmacy, or through some organizations that make testing available in other places. You can also do an antigen test at home.  Antigen tests are not as accurate as molecular tests. They are more likely to give "false negative" results. This is when the test comes back negative even though the person actually is infected. But antigen tests can still be useful in some situations, when results are needed quickly or a molecular test is not available. For example, if a person has early symptoms of COVID-19, an antigen test can be accurate enough to detect virus in their body. If a person gets an antigen test and the result is negative, a molecular test might be needed to confirm they do not have the virus in their body. This might be done if the person has symptoms or knows they were exposed the virus.  There is also a blood test that can show if a person has had COVID-19 in the past. This is called an "antibody" test. Antibody tests are generally not used on their own to diagnose COVID-19 or make decisions about care. But experts can use them to learn how many people in a certain area were infected without knowing it.    Can COVID-19 be prevented?   The best way to prevent COVID-19 is to get vaccinated. In the [place], the first vaccines became available in late 2020. People age 12 and older can get a vaccine.  If enough people get the vaccine, the virus will stop spreading so quickly. More information about COVID-19 vaccines, including what you can do after being vaccinated, is available separately. (See "Patient education: COVID-19 vaccines (The Basics)".)  Experts believe that vaccines will be one of the most important ways to control the COVID-19 " "pandemic. People who are fully vaccinated are at much lower risk of getting the virus.  If you are not yet vaccinated, there are other ways to help protect yourself and others:  Practice "social distancing." It's most important to avoid contact with people who are sick. But social distancing also means staying at least 6 feet (about 2 meters) from anyone outside your household. That's because the virus can spread easily through close contact, and it's not always possible to know who is infected.  Wear a face mask when you need to go be in public around other people. This is mostly so that if you are infected, even if you don't have any symptoms, you are less likely to spread the infection to other people. It might also help protect you from others who could be infected. Make sure your mask covers your mouth and nose.  You can buy cloth masks and disposable (non-medical) masks in stores or online. Cloth masks work best if they have several layers of fabric. Your mask should fit snugly over your face with no gaps. You can improve the fit by using a mask with an adjustable nose wire, adjusting or knotting the ear loops to make it tighter, or wearing a cloth mask on top of a disposable mask.  When you take your mask off, make sure you do not touch your eyes, nose, or mouth. And wash your hands after you touch the mask. You can wash cloth masks with the rest of your laundry.  When you are outdoors and not around other people, you might not need to wear a mask. But it's important to know what the rules are in your area. The [place] Centers for Disease Control and Prevention (CDC) has more information about how to wear a face mask: www.cdc.gov/coronavirus/2019-ncov/prevent-getting-sick/about-face-coverings.html.  Wash your hands with soap and water often. This is especially important after being out in public or touching surfaces that many other people also touch, like door handles or railings. The risk of getting " "infected by touching items like this is probably not very high. Still, it's a good idea to wash your hands often. This also helps protect you from other illnesses, like the flu or the common cold.  Make sure to rub your hands with soap for at least 20 seconds, cleaning your wrists, fingernails, and in between your fingers. Then rinse your hands and dry them with a paper towel you can throw away. If you are not near a sink, you can use a hand sanitizing gel to clean your hands. The gels with at least 60 percent alcohol work the best. But it is better to wash with soap and water if you can.  Avoid touching your face, especially your mouth, nose, and eyes.  Avoid or limit traveling if you can. Any form of travel, especially if you spend time in crowded places like airports, increases your risk of getting and spreading infection.  If you do need to travel, be sure to check whether there are any rules about COVID-19 in the area you are visiting. In the [place], some places require people to "self-quarantine" for some length of time if they are visiting (or returning) from another state. This means not going out in public or being around other people. The [place] also requires a negative COVID-19 test for anyone who enters, or returns to, the country. Many other countries have testing requirements for visiting, too. All of these rules are meant to help slow the spread of COVID-19.  Once you are fully vaccinated, you are much less likely to get the virus. "Fully vaccinated" means you have had all doses of the vaccine and it has been at least 2 weeks since the last dose. (If you had a single-dose vaccine, you are fully vaccinated 2 weeks after you get the shot.)    What should I do if I have symptoms?   If you have a fever, cough, trouble breathing, or other symptoms of COVID-19, call your doctor or nurse. They will ask about your symptoms. They might also ask about any recent travel and whether you have been " around anyone who might have been infected. Then they can tell you if you should come in or go somewhere else to be tested.  If your symptoms are not severe, it is best to call before you go in. The staff can tell you what to do and whether you need to be seen in person. Many people with only mild symptoms should stay home and avoid other people until they get better. If you do need to go to the clinic or hospital, be sure to wear a mask. This helps protect other people. The staff might also have you wait someplace away from other people.  If you are severely ill and need to go to the clinic or hospital right away, you should still call ahead if possible. This way the staff can care for you while taking steps to protect others. If you think you are having a medical emergency, call for an ambulance (in the [place] and [place], dial [id number]).  What if I feel fine but think I was exposed?   If you think you were in close contact with someone with COVID-19, what to do next depends on whether you have already had COVID-19 or gotten the vaccine:  If you have not had COVID-19 or gotten the vaccine - You should get tested after a possible exposure, even if you don't have any symptoms. Call your doctor or nurse if you aren't sure where to get a test. Then self-quarantine at home and monitor yourself for symptoms. This means staying home as much as possible, and staying at least 6 feet (2 meters) away from other people in your home.  The safest thing to do after a possible exposure is to self-quarantine for 14 days. This can be challenging with work, school, or other responsibilities. Because of this, some public health departments might allow people to stop quarantining sooner, especially if they get a negative test. If you're not sure how long to quarantine for, contact your local public health office or ask your doctor or nurse.  If you have had COVID-19 or gotten the vaccine - If you had COVID-19 within the last 3 months, you do  "not need to self-quarantine. If you had COVID-19 but it was more than 3 months ago, follow the steps above.  If you are fully vaccinated, you do not need to self-quarantine. But you should still get tested 3 to 5 days after you were in contact with the person who had COVID-19. Even though you are much less likely to get the infection after being vaccinated, it is still possible.  If you self-quarantine for less than 14 days, or if you do not need to self-quarantine, you should still monitor yourself for symptoms for the full 14 days. If you start to have any symptoms, call your doctor or nurse right away. You should also be extra careful about wearing a mask and social distancing during this time.  How is COVID-19 treated?   Many people will be able to stay home while they get better. But people with serious symptoms or other health problems might need to go to the hospital.  Mild illness - Mild illness means you might have symptoms like fever and cough, but you do not have trouble breathing. Most people with COVID-19 have mild illness and can rest at home until they get better. This usually takes about 2 weeks, but it's not the same for everyone.  If you are recovering from COVID-19, it's important to stay home and "self-isolate" until your doctor or nurse tells you it's safe to stop. Self-isolation means staying apart from other people, even the people you live with. When you can stop self-isolation will depend on how long it has been since you had symptoms, and in some cases, whether you have had a negative test (showing that the virus is no longer in your body).  Severe illness - If you have more severe illness with trouble breathing, you might need to stay in the hospital, possibly in the intensive care unit (also called the "ICU"). While you are there, you will most likely be in a special isolation room. Only medical staff will be allowed in the room, and they will have to wear special gowns, gloves, masks, " "and eye protection.  The doctors and nurses can monitor and support your breathing and other body functions and make you as comfortable as possible. You might need extra oxygen to help you breathe easily. If you are having a very hard time breathing, you might need a breathing tube. The tube goes down your throat and into your lungs. It is connected to a machine to help you breathe, called a "ventilator."  Doctors are studying several possible treatments for COVID-19. In certain cases, they might recommend treatments called "monoclonal antibodies." These treatments seem to help some people who are at risk of getting severely ill.  Doctors also might recommend being part of a clinical trial. A clinical trial is a scientific study that tests new medicines to see how well they work. Do not try any new medicines or treatments without talking to a doctor.    What should I do if someone in my home has COVID-19?   If someone in your home has COVID-19, there are additional things you can do to protect yourself and others:  Keep the sick person away from others - The sick person should stay in a separate room, and use a different bathroom if possible. They should also eat in their own room.  Experts also recommend that the person stay away from pets in the house until they are better.  Have them wear a mask - The sick person should wear a mask when they are in the same room as other people. If they can't wear a mask, you can help protect yourself by covering your face when you are in the room with them.  Wash hands - Wash your hands with soap and water often.  Clean often - Here are some specific things that can help:  Wear disposable gloves when you clean. It's also a good idea to wear gloves when you have to touch the sick person's laundry, dishes, utensils, or trash. Wash your hands after removing your gloves.  Regularly clean things that are touched a lot. This includes counters, bedside tables, doorknobs, computers, " "phones, and bathroom surfaces.  Clean things in your home with soap and water, but also use disinfectants on appropriate surfaces. Some cleaning products work well to kill bacteria, but not viruses, so it's important to check labels. The [place] Environmental Protection Agency (EPA) has a list of products here: www.epa.gov/pesticide-registration/list-n-disinfectants-use-against-sars-cov-2.    What if I am pregnant?   More information about COVID-19 and pregnancy is available separately. (See "Patient education: COVID-19 and pregnancy (The Basics)".)  If you are pregnant and you have questions about COVID-19, talk to your doctor, nurse, or midwife. They can help.    What can I do to cope with stress and anxiety?   It's normal to feel anxious or worried about COVID-19. It's also normal to feel stressed, lonely, or tired of not being able to do your usual activities. You can take care of yourself by trying to:  Take breaks from the news  Get regular exercise and eat healthy foods  Find activities that you enjoy and can do at home  Stay in touch with your friends and family members  It might help to remember that by doing things like getting vaccinated and following local guidelines, you are helping to protect other people in your community.    Where can I go to learn more?   As we learn more about this virus, expert recommendations will continue to change. Check with your doctor or public health official to get the most updated information about how to protect yourself and others.  For information about COVID-19 in your area, you can call your local public health office. In the [place], this usually means your city or town's Board of Health. Many states also have a "hotline" phone number you can call.  You can find more information about COVID-19 at the following websites:  [place] Centers for Disease Control and Prevention (CDC): www.cdc.gov/COVID19  World Health Organization (WHO): " www.who.int/emergencies/diseases/novel-coronavirus-2019  All topics are updated as new evidence becomes available and our peer review process is complete.    This topic retrieved from [employer] on: Oct 28, 2021.  Topic 349261 Version 67.0  Release: 29.4.2 - C29.263  © 2021 UpToDate, Inc. and/or its affiliates. All rights reserved.  Consumer Information Use and Disclaimer   This information is not specific medical advice and does not replace information you receive from your health care provider. This is only a brief summary of general information. It does NOT include all information about conditions, illnesses, injuries, tests, procedures, treatments, therapies, discharge instructions or life-style choices that may apply to you. You must talk with your health care provider for complete information about your health and treatment options. This information should not be used to decide whether or not to accept your health care provider's advice, instructions or recommendations. Only your health care provider has the knowledge and training to provide advice that is right for you. The use of this information is governed by the [employer] End User License Agreement, available at https://[email or web address]/solutions/[email or web address]/charles.The use of [employer] content is governed by the [employer] Terms of Use. ©2021 UpToDate, Inc. All rights reserved.  Copyright   © 2021 UpToDate, Inc. and/or its affiliates. All rights reserved.

## 2022-07-20 DIAGNOSIS — I10 PRIMARY HYPERTENSION: ICD-10-CM

## 2022-07-26 ENCOUNTER — OFFICE VISIT (OUTPATIENT)
Dept: INTERNAL MEDICINE | Facility: CLINIC | Age: 57
End: 2022-07-26
Payer: COMMERCIAL

## 2022-07-26 VITALS
OXYGEN SATURATION: 96 % | HEART RATE: 85 BPM | BODY MASS INDEX: 31.32 KG/M2 | DIASTOLIC BLOOD PRESSURE: 74 MMHG | TEMPERATURE: 98 F | SYSTOLIC BLOOD PRESSURE: 120 MMHG | WEIGHT: 183.44 LBS | HEIGHT: 64 IN

## 2022-07-26 DIAGNOSIS — E78.5 HYPERLIPIDEMIA, UNSPECIFIED HYPERLIPIDEMIA TYPE: ICD-10-CM

## 2022-07-26 DIAGNOSIS — F17.200 SMOKER: ICD-10-CM

## 2022-07-26 DIAGNOSIS — F41.9 ANXIETY: ICD-10-CM

## 2022-07-26 DIAGNOSIS — F32.1 CURRENT MODERATE EPISODE OF MAJOR DEPRESSIVE DISORDER WITHOUT PRIOR EPISODE: Primary | ICD-10-CM

## 2022-07-26 DIAGNOSIS — F99 INSOMNIA DUE TO OTHER MENTAL DISORDER: ICD-10-CM

## 2022-07-26 DIAGNOSIS — R53.83 FATIGUE, UNSPECIFIED TYPE: ICD-10-CM

## 2022-07-26 DIAGNOSIS — E66.9 OBESITY, CLASS I, BMI 30.0-34.9 (SEE ACTUAL BMI): ICD-10-CM

## 2022-07-26 DIAGNOSIS — F51.05 INSOMNIA DUE TO OTHER MENTAL DISORDER: ICD-10-CM

## 2022-07-26 DIAGNOSIS — U07.1 COVID-19: ICD-10-CM

## 2022-07-26 DIAGNOSIS — R73.9 HYPERGLYCEMIA: ICD-10-CM

## 2022-07-26 PROCEDURE — 99214 PR OFFICE/OUTPT VISIT, EST, LEVL IV, 30-39 MIN: ICD-10-PCS | Mod: S$GLB,,, | Performed by: NURSE PRACTITIONER

## 2022-07-26 PROCEDURE — 3008F BODY MASS INDEX DOCD: CPT | Mod: CPTII,S$GLB,, | Performed by: NURSE PRACTITIONER

## 2022-07-26 PROCEDURE — 1160F RVW MEDS BY RX/DR IN RCRD: CPT | Mod: CPTII,S$GLB,, | Performed by: NURSE PRACTITIONER

## 2022-07-26 PROCEDURE — 3078F DIAST BP <80 MM HG: CPT | Mod: CPTII,S$GLB,, | Performed by: NURSE PRACTITIONER

## 2022-07-26 PROCEDURE — 3074F SYST BP LT 130 MM HG: CPT | Mod: CPTII,S$GLB,, | Performed by: NURSE PRACTITIONER

## 2022-07-26 PROCEDURE — 1160F PR REVIEW ALL MEDS BY PRESCRIBER/CLIN PHARMACIST DOCUMENTED: ICD-10-PCS | Mod: CPTII,S$GLB,, | Performed by: NURSE PRACTITIONER

## 2022-07-26 PROCEDURE — 3078F PR MOST RECENT DIASTOLIC BLOOD PRESSURE < 80 MM HG: ICD-10-PCS | Mod: CPTII,S$GLB,, | Performed by: NURSE PRACTITIONER

## 2022-07-26 PROCEDURE — 3008F PR BODY MASS INDEX (BMI) DOCUMENTED: ICD-10-PCS | Mod: CPTII,S$GLB,, | Performed by: NURSE PRACTITIONER

## 2022-07-26 PROCEDURE — 1159F PR MEDICATION LIST DOCUMENTED IN MEDICAL RECORD: ICD-10-PCS | Mod: CPTII,S$GLB,, | Performed by: NURSE PRACTITIONER

## 2022-07-26 PROCEDURE — 1159F MED LIST DOCD IN RCRD: CPT | Mod: CPTII,S$GLB,, | Performed by: NURSE PRACTITIONER

## 2022-07-26 PROCEDURE — 99999 PR PBB SHADOW E&M-EST. PATIENT-LVL IV: ICD-10-PCS | Mod: PBBFAC,,, | Performed by: NURSE PRACTITIONER

## 2022-07-26 PROCEDURE — 99999 PR PBB SHADOW E&M-EST. PATIENT-LVL IV: CPT | Mod: PBBFAC,,, | Performed by: NURSE PRACTITIONER

## 2022-07-26 PROCEDURE — 99214 OFFICE O/P EST MOD 30 MIN: CPT | Mod: S$GLB,,, | Performed by: NURSE PRACTITIONER

## 2022-07-26 PROCEDURE — 3074F PR MOST RECENT SYSTOLIC BLOOD PRESSURE < 130 MM HG: ICD-10-PCS | Mod: CPTII,S$GLB,, | Performed by: NURSE PRACTITIONER

## 2022-07-26 RX ORDER — CHOLECALCIFEROL (VITAMIN D3) 25 MCG
1000 TABLET ORAL DAILY
COMMUNITY

## 2022-07-26 RX ORDER — DEXAMETHASONE 6 MG/1
6 TABLET ORAL DAILY
Qty: 7 TABLET | Refills: 0 | Status: SHIPPED | OUTPATIENT
Start: 2022-07-26 | End: 2022-08-02

## 2022-07-26 RX ORDER — ACETAMINOPHEN, DIPHENHYDRAMINE HCL, PHENYLEPHRINE HCL 325; 25; 5 MG/1; MG/1; MG/1
TABLET ORAL
COMMUNITY

## 2022-07-26 RX ORDER — GUAIFENESIN, PSEUDOEPHEDRINE HYDROCHLORIDE 600; 60 MG/1; MG/1
1 TABLET, EXTENDED RELEASE ORAL
COMMUNITY

## 2022-07-26 RX ORDER — BUPROPION HYDROCHLORIDE 300 MG/1
300 TABLET ORAL DAILY
Qty: 30 TABLET | Refills: 11 | Status: SHIPPED | OUTPATIENT
Start: 2022-07-26

## 2022-07-26 RX ORDER — HYDROXYZINE HYDROCHLORIDE 25 MG/1
25 TABLET, FILM COATED ORAL NIGHTLY PRN
Qty: 30 TABLET | Refills: 0 | Status: SHIPPED | OUTPATIENT
Start: 2022-07-26

## 2022-07-26 NOTE — ASSESSMENT & PLAN NOTE
Increasing wellbutrin. Also adding hydroxyzine PRN for anxiety and sleep instead of trying benzo first. Follow up in one month.

## 2022-07-26 NOTE — ASSESSMENT & PLAN NOTE
Has not had labs in several years. Had hyperglycemia on last labs so will check a1c as well. Has had increased due to covid, but had fatigue prior as well.

## 2022-07-26 NOTE — PROGRESS NOTES
Subjective:       Patient ID: Pilar Flores is a 56 y.o. female.    Chief Complaint: Depression (2 weeks)    Mrs. Flores presents to clinic for 2 week depression medication follow-up. Reports she is unsure if medication has been effective d/t recently getting diagnosed with Covid-19 on 7/19/22. Reports having some fatigue and productive coughing, although has decreased from initial onset of symptoms. Has been treating with OTC sinus medications and vitamins. Reports still having crying episodes daily, and beliefs this is understandably due to the recent loss of significant other at the beginning if this July. Denies any other persons in home or children. But brother lives next door and visits daily. Reports having insomnia and melatonin 10 mg  has been ineffective.     Depression  Visit Type: follow-up  Patient presents with the following symptoms: anhedonia, decreased concentration, depressed mood, fatigue, insomnia, nervousness/anxiety and shortness of breath.  Patient is not experiencing: confusion, dizziness, nausea, palpitations, panic, suicidal ideas, suicidal planning and thoughts of death.  Frequency of symptoms: most days   Episode duration: 30 minutes  Severity: moderate   Sleep per night: 4 hours  Sleep quality: poor  Nighttime awakenings: many  Compliance with medications:  %            Patient Active Problem List   Diagnosis    Obesity, Class I, BMI 30.0-34.9 (see actual BMI)    Smoker    Hyperlipidemia    Psoriasis    Anxiety    Current moderate episode of major depressive disorder without prior episode    Fatigue       Family History   Problem Relation Age of Onset    Heart disease Mother     Cancer Father      Past Surgical History:   Procedure Laterality Date    UTERINE FIBROID SURGERY           Current Outpatient Medications:     amLODIPine (NORVASC) 10 MG tablet, Take 1 tablet (10 mg total) by mouth once daily., Disp: 90 tablet, Rfl: 3    ascorbic acid, vitamin C, (VITAMIN C)  100 MG tablet, Take 100 mg by mouth once daily., Disp: , Rfl:     aspirin (ECOTRIN) 81 MG EC tablet, Take 81 mg by mouth once daily., Disp: , Rfl:     halobetasol (ULTRAVATE) 0.05 % cream, Apply topically 2 (two) times daily., Disp: 50 g, Rfl: 2    melatonin 10 mg Tab, Take by mouth., Disp: , Rfl:     pseudoephedrine-guaiFENesin  mg (MUCINEX D)  mg per tablet, Take 1 tablet by mouth every 12 (twelve) hours., Disp: , Rfl:     vitamin D (VITAMIN D3) 1000 units Tab, Take 1,000 Units by mouth once daily., Disp: , Rfl:     buPROPion (WELLBUTRIN XL) 300 MG 24 hr tablet, Take 1 tablet (300 mg total) by mouth once daily., Disp: 30 tablet, Rfl: 11    dexAMETHasone (DECADRON) 6 MG tablet, Take 1 tablet (6 mg total) by mouth Daily. for 7 days, Disp: 7 tablet, Rfl: 0    hydrOXYzine HCL (ATARAX) 25 MG tablet, Take 1 tablet (25 mg total) by mouth nightly as needed for Anxiety (insomnia)., Disp: 30 tablet, Rfl: 0    Review of Systems   Constitutional: Positive for activity change (since covid), appetite change (with covid) and fatigue. Negative for chills, diaphoresis and fever.   HENT: Positive for congestion. Negative for ear pain, postnasal drip, sinus pain, sore throat, tinnitus, trouble swallowing and voice change.    Eyes: Negative for pain, discharge and redness.   Respiratory: Positive for shortness of breath. Negative for apnea and wheezing.    Cardiovascular: Negative for chest pain and palpitations.   Gastrointestinal: Negative for constipation, diarrhea and vomiting.   Endocrine: Negative for polydipsia, polyphagia and polyuria.   Genitourinary: Negative for difficulty urinating, flank pain and hematuria.   Musculoskeletal: Negative for arthralgias and neck pain.   Skin: Negative for color change.   Allergic/Immunologic: Negative for environmental allergies, food allergies and immunocompromised state.   Neurological: Negative for dizziness, syncope, light-headedness and headaches.  "  Psychiatric/Behavioral: Positive for decreased concentration, depression, dysphoric mood and sleep disturbance. Negative for confusion and suicidal ideas. The patient is nervous/anxious and has insomnia.        Objective:   /74 (BP Location: Left arm, Patient Position: Sitting, BP Method: Medium (Manual))   Pulse 85   Temp 97.9 °F (36.6 °C) (Temporal)   Ht 5' 4" (1.626 m)   Wt 83.2 kg (183 lb 6.8 oz)   SpO2 96%   BMI 31.48 kg/m²      Physical Exam  Constitutional:       General: She is not in acute distress.     Appearance: Normal appearance. She is obese. She is not ill-appearing.   HENT:      Head: Normocephalic and atraumatic.      Nose: Nose normal.      Mouth/Throat:      Mouth: Mucous membranes are moist.      Pharynx: Oropharynx is clear. No oropharyngeal exudate or posterior oropharyngeal erythema.   Eyes:      Pupils: Pupils are equal, round, and reactive to light.   Cardiovascular:      Rate and Rhythm: Normal rate and regular rhythm.      Pulses: Normal pulses.      Heart sounds: Normal heart sounds.   Pulmonary:      Effort: Pulmonary effort is normal.      Breath sounds: Normal breath sounds.   Musculoskeletal:         General: Normal range of motion.      Cervical back: Normal range of motion. No tenderness.      Right lower leg: No edema.      Left lower leg: No edema.   Lymphadenopathy:      Cervical: No cervical adenopathy.   Skin:     General: Skin is warm and dry.   Neurological:      Mental Status: She is alert and oriented to person, place, and time.   Psychiatric:         Mood and Affect: Mood is depressed. Affect is tearful.         Assessment & Plan     Problem List Items Addressed This Visit        Psychiatric    Anxiety    Current Assessment & Plan     Increasing wellbutrin. Also adding hydroxyzine PRN for anxiety and sleep instead of trying benzo first. Follow up in one month.            Relevant Medications    hydrOXYzine HCL (ATARAX) 25 MG tablet    Current moderate episode " "of major depressive disorder without prior episode - Primary    Current Assessment & Plan     Increasing wellbutrin. Follow up in one month.            Relevant Medications    buPROPion (WELLBUTRIN XL) 300 MG 24 hr tablet       Cardiac/Vascular    Hyperlipidemia    Current Assessment & Plan     Due for lipid panel. Not currently on medications.            Relevant Orders    Lipid Panel       Endocrine    Obesity, Class I, BMI 30.0-34.9 (see actual BMI)    Current Assessment & Plan     Counseled on importance of diet and exercise in order to improve overall quality of life, and reduce risk of future comorbidities.                Other    Smoker    Current Assessment & Plan     No improvement since starting wellbutrin, also did just have covid. Increasing wellbutrin for depression purposes with hopes of dual benefits.            Relevant Medications    buPROPion (WELLBUTRIN XL) 300 MG 24 hr tablet    Fatigue    Current Assessment & Plan     Has not had labs in several years. Had hyperglycemia on last labs so will check a1c as well. Has had increased due to covid, but had fatigue prior as well.            Relevant Orders    Comprehensive Metabolic Panel    TSH    CBC Auto Differential      Other Visit Diagnoses     COVID-19        Relevant Medications    dexAMETHasone (DECADRON) 6 MG tablet    Hyperglycemia        Relevant Orders    Hemoglobin A1C    Insomnia due to other mental disorder        Relevant Medications    hydrOXYzine HCL (ATARAX) 25 MG tablet        Follow up in about 1 month (around 8/26/2022) for depression. (virtual ok). .            Portions of this note may have been created with voice recognition software. Occasional "wrong-word" or "sound-a-like" substitutions may have occurred due to the inherent limitations of voice recognition software. Please, read the note carefully and recognize, using context, where substitutions have occurred.       "

## 2022-07-26 NOTE — ASSESSMENT & PLAN NOTE
No improvement since starting wellbutrin, also did just have covid. Increasing wellbutrin for depression purposes with hopes of dual benefits.

## 2022-08-10 ENCOUNTER — HOSPITAL ENCOUNTER (OUTPATIENT)
Dept: RADIOLOGY | Facility: HOSPITAL | Age: 57
Discharge: HOME | End: 2022-08-10
Attending: NURSE PRACTITIONER
Payer: COMMERCIAL

## 2022-08-10 VITALS — BODY MASS INDEX: 31.32 KG/M2 | HEIGHT: 64 IN | WEIGHT: 183.44 LBS

## 2022-08-10 DIAGNOSIS — Z12.31 BREAST CANCER SCREENING BY MAMMOGRAM: ICD-10-CM

## 2022-08-10 PROCEDURE — 77063 MAMMO DIGITAL SCREENING BILAT WITH TOMO: ICD-10-PCS | Mod: 26,,, | Performed by: RADIOLOGY

## 2022-08-10 PROCEDURE — 77063 BREAST TOMOSYNTHESIS BI: CPT | Mod: 26,,, | Performed by: RADIOLOGY

## 2022-08-10 PROCEDURE — 77067 SCR MAMMO BI INCL CAD: CPT | Mod: TC,PO

## 2022-08-10 PROCEDURE — 77067 MAMMO DIGITAL SCREENING BILAT WITH TOMO: ICD-10-PCS | Mod: 26,,, | Performed by: RADIOLOGY

## 2022-08-10 PROCEDURE — 77067 SCR MAMMO BI INCL CAD: CPT | Mod: 26,,, | Performed by: RADIOLOGY

## 2022-08-12 DIAGNOSIS — D72.829 LEUKOCYTOSIS, UNSPECIFIED TYPE: Primary | ICD-10-CM

## 2023-01-25 ENCOUNTER — PATIENT MESSAGE (OUTPATIENT)
Dept: ADMINISTRATIVE | Facility: HOSPITAL | Age: 58
End: 2023-01-25
Payer: COMMERCIAL

## 2023-04-19 ENCOUNTER — PATIENT MESSAGE (OUTPATIENT)
Dept: ADMINISTRATIVE | Facility: HOSPITAL | Age: 58
End: 2023-04-19
Payer: COMMERCIAL

## 2023-07-19 ENCOUNTER — PATIENT MESSAGE (OUTPATIENT)
Dept: ADMINISTRATIVE | Facility: HOSPITAL | Age: 58
End: 2023-07-19
Payer: COMMERCIAL

## 2023-11-22 DIAGNOSIS — Z12.31 OTHER SCREENING MAMMOGRAM: ICD-10-CM

## 2023-12-14 NOTE — TELEPHONE ENCOUNTER
----- Message from Sarthak Hernández sent at 12/1/2017  8:27 AM CST -----  Contact: [phone]   Pt would like a return to work excuse for Monday 12/04/17.  Pt would like excuse upload to my [facility] if possible.  Please call back at [phone]. Thx-AH   
See [employer] message.  
Thierry correa

## 2025-04-28 ENCOUNTER — HOSPITAL ENCOUNTER (OUTPATIENT)
Dept: RADIOLOGY | Facility: HOSPITAL | Age: 60
Discharge: HOME | End: 2025-04-28
Attending: NURSE PRACTITIONER
Payer: COMMERCIAL

## 2025-04-28 ENCOUNTER — OFFICE VISIT (OUTPATIENT)
Dept: INTERNAL MEDICINE | Facility: CLINIC | Age: 60
End: 2025-04-28
Payer: COMMERCIAL

## 2025-04-28 ENCOUNTER — RESULTS FOLLOW-UP (OUTPATIENT)
Dept: INTERNAL MEDICINE | Facility: CLINIC | Age: 60
End: 2025-04-28

## 2025-04-28 VITALS
HEART RATE: 106 BPM | WEIGHT: 199.31 LBS | SYSTOLIC BLOOD PRESSURE: 136 MMHG | TEMPERATURE: 98 F | DIASTOLIC BLOOD PRESSURE: 76 MMHG | HEIGHT: 64 IN | BODY MASS INDEX: 34.03 KG/M2 | OXYGEN SATURATION: 96 %

## 2025-04-28 DIAGNOSIS — E78.5 HYPERLIPIDEMIA, UNSPECIFIED HYPERLIPIDEMIA TYPE: ICD-10-CM

## 2025-04-28 DIAGNOSIS — F41.9 ANXIETY: ICD-10-CM

## 2025-04-28 DIAGNOSIS — J40 BRONCHITIS: ICD-10-CM

## 2025-04-28 DIAGNOSIS — Z12.31 BREAST CANCER SCREENING BY MAMMOGRAM: ICD-10-CM

## 2025-04-28 DIAGNOSIS — L40.9 PSORIASIS: ICD-10-CM

## 2025-04-28 DIAGNOSIS — Z11.4 SCREENING FOR HIV WITHOUT PRESENCE OF RISK FACTORS: ICD-10-CM

## 2025-04-28 DIAGNOSIS — E66.811 OBESITY, CLASS I, BMI 30.0-34.9 (SEE ACTUAL BMI): ICD-10-CM

## 2025-04-28 DIAGNOSIS — R06.02 SHORTNESS OF BREATH: Primary | ICD-10-CM

## 2025-04-28 DIAGNOSIS — R06.02 SHORTNESS OF BREATH: ICD-10-CM

## 2025-04-28 DIAGNOSIS — F32.1 CURRENT MODERATE EPISODE OF MAJOR DEPRESSIVE DISORDER WITHOUT PRIOR EPISODE: ICD-10-CM

## 2025-04-28 DIAGNOSIS — J02.9 SORE THROAT: ICD-10-CM

## 2025-04-28 DIAGNOSIS — Z13.1 DIABETES MELLITUS SCREENING: ICD-10-CM

## 2025-04-28 DIAGNOSIS — F17.200 SMOKER: ICD-10-CM

## 2025-04-28 LAB
CTP QC/QA: YES
MOLECULAR STREP A: NEGATIVE

## 2025-04-28 PROCEDURE — 1160F RVW MEDS BY RX/DR IN RCRD: CPT | Mod: CPTII,S$GLB,, | Performed by: NURSE PRACTITIONER

## 2025-04-28 PROCEDURE — 3008F BODY MASS INDEX DOCD: CPT | Mod: CPTII,S$GLB,, | Performed by: NURSE PRACTITIONER

## 2025-04-28 PROCEDURE — 3044F HG A1C LEVEL LT 7.0%: CPT | Mod: CPTII,S$GLB,, | Performed by: NURSE PRACTITIONER

## 2025-04-28 PROCEDURE — 87651 STREP A DNA AMP PROBE: CPT | Mod: QW,S$GLB,, | Performed by: NURSE PRACTITIONER

## 2025-04-28 PROCEDURE — 99999 PR PBB SHADOW E&M-EST. PATIENT-LVL IV: CPT | Mod: PBBFAC,,, | Performed by: NURSE PRACTITIONER

## 2025-04-28 PROCEDURE — 3078F DIAST BP <80 MM HG: CPT | Mod: CPTII,S$GLB,, | Performed by: NURSE PRACTITIONER

## 2025-04-28 PROCEDURE — 1159F MED LIST DOCD IN RCRD: CPT | Mod: CPTII,S$GLB,, | Performed by: NURSE PRACTITIONER

## 2025-04-28 PROCEDURE — 71046 X-RAY EXAM CHEST 2 VIEWS: CPT | Mod: 26,,, | Performed by: STUDENT IN AN ORGANIZED HEALTH CARE EDUCATION/TRAINING PROGRAM

## 2025-04-28 PROCEDURE — 99214 OFFICE O/P EST MOD 30 MIN: CPT | Mod: S$GLB,,, | Performed by: NURSE PRACTITIONER

## 2025-04-28 PROCEDURE — 71046 X-RAY EXAM CHEST 2 VIEWS: CPT | Mod: TC,PO

## 2025-04-28 PROCEDURE — 3075F SYST BP GE 130 - 139MM HG: CPT | Mod: CPTII,S$GLB,, | Performed by: NURSE PRACTITIONER

## 2025-04-28 PROCEDURE — G2211 COMPLEX E/M VISIT ADD ON: HCPCS | Mod: S$GLB,,, | Performed by: NURSE PRACTITIONER

## 2025-04-28 RX ORDER — HALOBETASOL PROPIONATE 0.5 MG/G
CREAM TOPICAL 2 TIMES DAILY PRN
Qty: 50 G | Refills: 2 | Status: SHIPPED | OUTPATIENT
Start: 2025-04-28

## 2025-04-28 NOTE — PROGRESS NOTES
Subjective:       Patient ID: Pilar Flores is a 59 y.o. female.    Chief Complaint: Sore Throat (X3 weeks, SOB )      History of Present Illness    CHIEF COMPLAINT:  - Ms. Flores presents with a persistent sore throat and worsening voice loss that started about three weeks ago, with recent onset of shortness of breath.    HPI:  Ms. Flores reports symptom onset approximately 3 weeks ago following spring yard work. She developed a sore throat the next day, which has progressively worsened, along with deteriorating voice loss. On the most recent Saturday, she developed shortness of breath. She reports general fatigue, though this is not unusual for her. She describes feeling swollen in her sinus cavity area. The shortness of breath occurs with minimal exertion, such as walking into the clinic, but she recovers quickly. She did not have significant breathing difficulties while showering today, even in hot and humid conditions. She also reports psoriasis breakouts, with the most severe on her ankle, managed with topical medication and moisturizer.    She denies chest pain, palpitations, fevers, chills, nasal congestion, ear pain, nausea, vomiting, diarrhea, dizziness, lightheadedness, and headaches.          Problem List[1]      Past Surgical History:   Procedure Laterality Date    UTERINE FIBROID SURGERY         Current Medications[2]    Review of Systems   Constitutional:  Positive for fatigue. Negative for activity change, appetite change, chills and fever.   HENT:  Positive for sinus pressure, sore throat and voice change. Negative for congestion, ear pain, postnasal drip, rhinorrhea, sinus pain and trouble swallowing.    Respiratory:  Positive for cough and shortness of breath.    Cardiovascular:  Negative for chest pain and palpitations.   Gastrointestinal:  Negative for abdominal pain, diarrhea, nausea and vomiting.   Genitourinary:  Negative for dysuria and frequency.   Neurological:  Negative for dizziness,  "light-headedness and headaches.   Psychiatric/Behavioral:  Negative for dysphoric mood. The patient is not nervous/anxious.        Objective:   /76 (BP Location: Left arm, Patient Position: Sitting)   Pulse 106   Temp 98.3 °F (36.8 °C) (Tympanic)   Ht 5' 4" (1.626 m)   Wt 90.4 kg (199 lb 4.7 oz)   SpO2 96%   BMI 34.21 kg/m²      Physical Exam  Vitals reviewed.   Constitutional:       General: She is not in acute distress.     Appearance: Normal appearance. She is well-developed. She is ill-appearing (mild). She is not diaphoretic.      Comments: Hoarse voice   HENT:      Head: Normocephalic.   Cardiovascular:      Rate and Rhythm: Normal rate and regular rhythm.      Pulses: Normal pulses.      Heart sounds: Normal heart sounds. No murmur heard.     No friction rub. No gallop.   Pulmonary:      Effort: Pulmonary effort is normal. No respiratory distress.      Breath sounds: No rales.      Comments: diminished  Musculoskeletal:      Cervical back: Normal range of motion and neck supple.   Skin:     General: Skin is warm and dry.      Coloration: Skin is not pale.      Findings: No erythema.   Neurological:      Mental Status: She is alert and oriented to person, place, and time.   Psychiatric:         Mood and Affect: Mood normal.         Behavior: Behavior normal.         Assessment & Plan     Problem List Items Addressed This Visit          Psychiatric    Anxiety    Current Assessment & Plan   Stable without medications.         Current moderate episode of major depressive disorder without prior episode    Current Assessment & Plan   Stable without medication             Derm    Psoriasis    Current Assessment & Plan   Halobetasol refilled.          Relevant Medications    halobetasol (ULTRAVATE) 0.05 % cream       Cardiac/Vascular    Hyperlipidemia    Current Assessment & Plan   Lipid panel ordered. Lifestyle modifications.             Endocrine    Obesity, Class I, BMI 30.0-34.9 (see actual BMI)    " Current Assessment & Plan   Counseled on importance of diet and exercise in order to improve overall quality of life, and reduce risk of future comorbidities.              Other    Smoker    Current Assessment & Plan   Has been cutting back on smoking, intermittent use of nicotine patch.           Other Visit Diagnoses         Shortness of breath    -  Primary    Relevant Orders    CBC Auto Differential (Completed)    Comprehensive Metabolic Panel (Completed)    X-Ray Chest PA And Lateral (Completed)    BNP (Completed)      Screening for HIV without presence of risk factors        Relevant Orders    HIV 1/2 Ag/Ab (4th Gen) (Completed)      Diabetes mellitus screening        Relevant Orders    Hemoglobin A1C (Completed)      Breast cancer screening by mammogram        Relevant Orders    Mammo Digital Screening Bilat w/ Black (XPD)      Sore throat        Relevant Medications    amoxicillin-clavulanate 875-125mg (AUGMENTIN) 875-125 mg per tablet    Other Relevant Orders    POCT Strep A, Molecular (Completed)      Bronchitis        Relevant Medications    amoxicillin-clavulanate 875-125mg (AUGMENTIN) 875-125 mg per tablet            Assessment & Plan    MEDICAL DECISION MAKING:  - Assessed symptoms of persistent sore throat, voice loss, and shortness of breath.  - Considered possibility of pneumonia, but opted for chest XR to rule out other conditions.  - Evaluated psoriasis flare-up, particularly on ankle.  - Noted diminished lung sounds, likely due to smoking history.  - Observed fluid in ears, suggesting possible allergies.  - Will perform strep test despite lower likelihood in patients over 40.  - Postponed cholesterol check due to non-fasting state.    PATIENT EDUCATION:  - Discussed importance of exercise in reducing need for cholesterol medication.    ACTION ITEMS/LIFESTYLE:  - Recommend increasing exercise to improve overall health.    MEDICATIONS:  - Recommend taking an allergy pill  - Refilled halobetasol for  "psoriasis management.    ORDERS:  - Ordered XR Chest to evaluate respiratory symptoms.  - Ordered strep test.  - Ordered comprehensive lab panel including CBC, LFTs, KFTs, electrolyte panel, A1c, and one-time HIV screening.  - Ordered mammogram.    FOLLOW UP:  - Follow up for cholesterol check (fasting required).  - Follow up to consider lung cancer screening.          Follow up if symptoms worsen or fail to improve.          Portions of this note may have been created with voice recognition software. Occasional "wrong-word" or "sound-a-like" substitutions may have occurred due to the inherent limitations of voice recognition software. Please, read the note carefully and recognize, using context, where substitutions have occurred.       This note was generated with the assistance of ambient listening technology. Verbal consent was obtained by the patient and accompanying visitor(s) for the recording of patient appointment to facilitate this note. I attest to having reviewed and edited the generated note for accuracy, though some syntax or spelling errors may persist. Please contact the author of this note for any clarification.            [1]   Patient Active Problem List  Diagnosis    Obesity, Class I, BMI 30.0-34.9 (see actual BMI)    Smoker    Hyperlipidemia    Psoriasis    Anxiety    Current moderate episode of major depressive disorder without prior episode    Fatigue   [2]   Current Outpatient Medications:     amoxicillin-clavulanate 875-125mg (AUGMENTIN) 875-125 mg per tablet, Take 1 tablet by mouth every 12 (twelve) hours. for 7 days, Disp: 14 tablet, Rfl: 0    aspirin (ECOTRIN) 81 MG EC tablet, Take 81 mg by mouth once daily. (Patient not taking: Reported on 4/28/2025), Disp: , Rfl:     halobetasol (ULTRAVATE) 0.05 % cream, Apply topically 2 (two) times daily as needed., Disp: 50 g, Rfl: 2    "

## 2025-04-29 ENCOUNTER — PATIENT MESSAGE (OUTPATIENT)
Dept: INTERNAL MEDICINE | Facility: CLINIC | Age: 60
End: 2025-04-29
Payer: COMMERCIAL

## 2025-04-29 RX ORDER — AMOXICILLIN AND CLAVULANATE POTASSIUM 875; 125 MG/1; MG/1
1 TABLET, FILM COATED ORAL EVERY 12 HOURS
Qty: 14 TABLET | Refills: 0 | Status: SHIPPED | OUTPATIENT
Start: 2025-04-29 | End: 2025-05-06

## 2025-05-09 ENCOUNTER — OFFICE VISIT (OUTPATIENT)
Dept: OTOLARYNGOLOGY | Facility: CLINIC | Age: 60
End: 2025-05-09
Payer: COMMERCIAL

## 2025-05-09 VITALS — BODY MASS INDEX: 34.03 KG/M2 | HEIGHT: 64 IN | WEIGHT: 199.31 LBS

## 2025-05-09 DIAGNOSIS — J38.1 REINKE'S EDEMA OF VOCAL FOLDS: Primary | ICD-10-CM

## 2025-05-09 DIAGNOSIS — F17.200 NICOTINE DEPENDENCE, UNCOMPLICATED, UNSPECIFIED NICOTINE PRODUCT TYPE: ICD-10-CM

## 2025-05-09 PROCEDURE — 99999 PR PBB SHADOW E&M-EST. PATIENT-LVL III: CPT | Mod: PBBFAC,,, | Performed by: STUDENT IN AN ORGANIZED HEALTH CARE EDUCATION/TRAINING PROGRAM

## 2025-05-09 RX ORDER — METHYLPREDNISOLONE 4 MG/1
TABLET ORAL
Qty: 21 TABLET | Refills: 0 | Status: SHIPPED | OUTPATIENT
Start: 2025-05-09

## 2025-05-09 NOTE — PROGRESS NOTES
"Chief complaint:    Chief Complaint   Patient presents with    Hoarse     Pt has had hoarseness since the first of April and on the 28th she was given antibiotics by her PCP.            Referring Provider:  Self, Aaareferral  No address on file    History of present illness:     Ms. Flores is a 59 y.o. presenting for evaluation of hoarseness.     Onset: years ago, but acutely worsened ~4-5 weeks ago when burning pine needles   Woke up next day with sore throat and voice has gotten worse since then  Severity: severe  Quality: hoarse, raspy and is worsening  The voice has been persistently hoarse.   Associated signs and symptoms:  odynophagia, globus   The patient denies neck mass, dysphagia, otalgia, unusual bleeding, or unintentional weight loss.   Smoking: Yes - many years   Prior medical therapy:  augmentin, oral antihistamine    History      Past Medical History:   Past Medical History:   Diagnosis Date    Anxiety     Hyperlipidemia     Psoriasis     Smoker          Past Surgical History:  Past Surgical History:   Procedure Laterality Date    UTERINE FIBROID SURGERY           Medications: Medication list reviewed. She  has a current medication list which includes the following prescription(s): aspirin, halobetasol, and methylprednisolone.     Allergies:   Review of patient's allergies indicates:   Allergen Reactions    Crayfish Hives         Family history: family history includes Cancer in her father; Heart disease in her mother.         Social History          Alcohol use:  reports current alcohol use of about 2.0 standard drinks of alcohol per week.            Tobacco:  reports that she has been smoking cigarettes. She has a 60 pack-year smoking history. She has never used smokeless tobacco.         Physical Examination      Vitals: Height 5' 4" (1.626 m), weight 90.4 kg (199 lb 4.7 oz).      General: Well developed, well nourished, well hydrated.    Voice: severe dysphonia, gravely, raspy, no dysarthria  "     Head/Face: Normocephalic, atraumatic. No scars or lesions. Facial musculature equal.     Eyes: No scleral icterus or conjunctival hemorrhage. EOMI. PERRLA.     Ears:     Right ear: No gross deformity. EAC is clear of debris and erythema. TM are intact with a pneumatized middle ear. No signs of retraction, fluid or infection.      Left ear: No gross deformity. EAC is clear of debris and erythema. TM are intact with a pneumatized middle ear. No signs of retraction, fluid or infection.      Nose: No gross deformity or lesions. No purulent discharge. No significant NSD.      Mouth/Oropharynx: Lips without any lesions. No mucosal lesions within the oropharynx. No tonsillar exudate or lesions. Pharyngeal walls symmetrical. Uvula midline. Tongue midline without lesions.     Neck: Trachea midline. No masses. No thyromegaly or nodules palpated.     Lymphatic: No lymphadenopathy in the neck.     Extremities: No cyanosis. Warm and well-perfused.     Skin: No scars or lesions on face or neck.      Neurologic: Moving all extremities without gross abnormality.CN II-XII grossly intact. House-Brackmann 1/6. No signs of nystagmus.          Data reviewed      Review of records:      I reviewed records from the referring provider's office visits, including the history, workup, and/or treatment of this problem thus far.      Imaging:      I have independently reviewed the following imaging with the findings noted below:     XR chest 4/29/25  No acute processes      Procedures:    Procedure -Transnasal fiberoptic laryngoscopy     Surgeon: Checo Andrade M.D. .      Anesthesia: topical 0.05% oxymetazoline with 4% lidocaine      Complications: None.     Description of Procedure: With the patient in the sitting position, topical lidocaine and oxymetazoline was applied to the nose. The scope was passed through the nose. Examination was carried out of the nose, nasopharynx, oropharynx, hypopharynx, and larynx with findings as noted above.  Scope was removed. The patient tolerated the procedure well.      Findings: No masses or lesions in the nose, nasopharynx, oropharynx, hypopharynx, or larynx. Vocal fold abduction and adduction is normal, but there is severe bilateral erythema and keri's edema. No pooling of secretions in the piriform sinuses, penetration, or aspiration.          Assessment/Plan:    1. Keri's edema of vocal folds    2. Nicotine dependence, uncomplicated, unspecified nicotine product type        -     Polypoid Corditis (or Keri's Edema) - Pilar has severe swelling of the vocal folds on both sides.  Her swelling is due to smoking.  This swelling has given Velvet a low-pitched, husky/raspy voice.  She complains of little shortness of breath. Smoking cessation is critical to treat the Keris edema. If she continues to smoke, the swelling would return after a surgical intervention. Sometimes, smoking cessation is all that is needed to improve or resolve the swelling and hoarseness. Occasionally a surgery is necessary to remove the polypoid tissue from the vocal cords.     We will hve her do a steroid pack to help rduce the acute swelling and f/u in 1 week to ensure improvement and rule out underlying carcinoma.              Checo Andrade MD  [facility]   [facility] [address]  [place] [address]  P: [phone]  F: [phone]

## 2025-05-16 ENCOUNTER — OFFICE VISIT (OUTPATIENT)
Dept: OTOLARYNGOLOGY | Facility: CLINIC | Age: 60
End: 2025-05-16
Payer: COMMERCIAL

## 2025-05-16 VITALS — WEIGHT: 201.5 LBS | HEIGHT: 64 IN | BODY MASS INDEX: 34.4 KG/M2

## 2025-05-16 DIAGNOSIS — K21.9 LARYNGOPHARYNGEAL REFLUX (LPR): ICD-10-CM

## 2025-05-16 DIAGNOSIS — J38.1 REINKE'S EDEMA OF VOCAL FOLDS: Primary | ICD-10-CM

## 2025-05-16 DIAGNOSIS — F17.200 NICOTINE DEPENDENCE, UNCOMPLICATED, UNSPECIFIED NICOTINE PRODUCT TYPE: ICD-10-CM

## 2025-05-16 PROCEDURE — 99999 PR PBB SHADOW E&M-EST. PATIENT-LVL III: CPT | Mod: PBBFAC,,, | Performed by: STUDENT IN AN ORGANIZED HEALTH CARE EDUCATION/TRAINING PROGRAM

## 2025-05-16 RX ORDER — PANTOPRAZOLE SODIUM 40 MG/1
40 TABLET, DELAYED RELEASE ORAL DAILY
Qty: 90 TABLET | Refills: 0 | Status: SHIPPED | OUTPATIENT
Start: 2025-05-16 | End: 2026-05-16

## 2025-05-16 NOTE — PROGRESS NOTES
"  Chief complaint:    Chief Complaint   Patient presents with    Follow-up     Per pt there has been no change since her last visit her throat is still sore.           Referring Provider:  No referring provider defined for this encounter.    History of present illness:     Ms. Flores is a 59 y.o. presenting for evaluation of hoarseness.     Onset: years ago, but acutely worsened ~4-5 weeks ago when burning pine needles   Woke up next day with sore throat and voice has gotten worse since then  Severity: severe  Quality: hoarse, raspy and is worsening  The voice has been persistently hoarse.   Associated signs and symptoms:  odynophagia, globus   The patient denies neck mass, dysphagia, otalgia, unusual bleeding, or unintentional weight loss.   Smoking: Yes - many years   Prior medical therapy:  augmentin, oral antihistamine    Return clinic visit, 5/16/25  Completed steroids  Improved pain  Breathing has improved some since last week  She dose have heart burn, worse at night. Using rolaids PRN    History      Past Medical History:   Past Medical History:   Diagnosis Date    Anxiety     Hyperlipidemia     Psoriasis     Smoker          Past Surgical History:  Past Surgical History:   Procedure Laterality Date    UTERINE FIBROID SURGERY           Medications: Medication list reviewed. She  has a current medication list which includes the following prescription(s): aspirin, halobetasol, and methylprednisolone.     Allergies:   Review of patient's allergies indicates:   Allergen Reactions    Crayfish Hives         Family history: family history includes Cancer in her father; Heart disease in her mother.         Social History          Alcohol use:  reports current alcohol use of about 2.0 standard drinks of alcohol per week.            Tobacco:  reports that she has been smoking cigarettes. She has a 60 pack-year smoking history. She has never used smokeless tobacco.         Physical Examination      Vitals: Height 5' 4" " (1.626 m), weight 91.4 kg (201 lb 8 oz).      General: Well developed, well nourished, well hydrated.    Voice: moderate to severe dysphonia, gravely, raspy, no dysarthria      Head/Face: Normocephalic, atraumatic. No scars or lesions. Facial musculature equal.     Eyes: No scleral icterus or conjunctival hemorrhage. EOMI. PERRLA.     Ears:     Right ear: No gross deformity. EAC is clear of debris and erythema. TM are intact with a pneumatized middle ear. No signs of retraction, fluid or infection.      Left ear: No gross deformity. EAC is clear of debris and erythema. TM are intact with a pneumatized middle ear. No signs of retraction, fluid or infection.      Nose: No gross deformity or lesions. No purulent discharge. No significant NSD.      Mouth/Oropharynx: Lips without any lesions. No mucosal lesions within the oropharynx. No tonsillar exudate or lesions. Pharyngeal walls symmetrical. Uvula midline. Tongue midline without lesions.     Neck: Trachea midline. No masses. No thyromegaly or nodules palpated.     Lymphatic: No lymphadenopathy in the neck.     Extremities: No cyanosis. Warm and well-perfused.     Skin: No scars or lesions on face or neck.      Neurologic: Moving all extremities without gross abnormality.CN II-XII grossly intact. House-Brackmann 1/6. No signs of nystagmus.          Data reviewed      Review of records:      I reviewed records from the referring provider's office visits, including the history, workup, and/or treatment of this problem thus far.      Imaging:      I have independently reviewed the following imaging with the findings noted below:     XR chest 4/29/25  No acute processes      Procedures:    Procedure -Transnasal fiberoptic laryngoscopy     Surgeon: Checo Andrade M.D. .      Anesthesia: topical 0.05% oxymetazoline with 4% lidocaine      Complications: None.     Description of Procedure: With the patient in the sitting position, topical lidocaine and oxymetazoline was  applied to the nose. The scope was passed through the nose. Examination was carried out of the nose, nasopharynx, oropharynx, hypopharynx, and larynx with findings as noted above. Scope was removed. The patient tolerated the procedure well.      Findings: No masses or lesions in the nose, nasopharynx, oropharynx, hypopharynx, or larynx. Vocal fold abduction and adduction is normal, but there is severe bilateral rachelle's edema, with mild improvement. Resolved erythema. No pooling of secretions in the piriform sinuses, penetration, or aspiration.          Assessment/Plan:    1. Rachelle's edema of vocal folds    2. Nicotine dependence, uncomplicated, unspecified nicotine product type          -     Polypoid Corditis (or Rachelle's Edema) - Pilar has severe swelling of the vocal folds on both sides.  Her swelling is due to smoking.  This swelling has given Velvet a low-pitched, husky/raspy voice.  She complains of little shortness of breath. Smoking cessation is critical to treat the Rachelles edema. If she continues to smoke, the swelling would return after a surgical intervention. Sometimes, smoking cessation is all that is needed to improve or resolve the swelling and hoarseness. Occasionally a surgery is necessary to remove the polypoid tissue from the vocal cords.     We will hve her do a steroid pack to help reduce the acute swelling and f/u in 1 week to ensure improvement and rule out underlying carcinoma.    Update 5/16/25  Improved inflammation and SOB with steroid pack and stopping smoking for 1 week, but still with severe rachelle's edema.  Tolerating nicotine patch well  We discussed continued smoking cessation, control of Laryngopharyngeal Reflux (adding PPI daily), and f/u in 1 month. If she has maintained smoking cessation, but has not had significant reduction of edema, we will plan for SMDL w/excision.          Checo Andrade MD  [facility]   [facility]  [address] [place] [address]  P: [phone]  F: [phone]

## 2025-06-13 ENCOUNTER — TELEPHONE (OUTPATIENT)
Dept: PULMONOLOGY | Facility: CLINIC | Age: 60
End: 2025-06-13
Payer: COMMERCIAL

## 2025-06-13 ENCOUNTER — OFFICE VISIT (OUTPATIENT)
Dept: OTOLARYNGOLOGY | Facility: CLINIC | Age: 60
End: 2025-06-13
Payer: COMMERCIAL

## 2025-06-13 VITALS — WEIGHT: 201.5 LBS | BODY MASS INDEX: 34.4 KG/M2 | HEIGHT: 64 IN

## 2025-06-13 DIAGNOSIS — J38.1 REINKE'S EDEMA OF VOCAL FOLDS: ICD-10-CM

## 2025-06-13 DIAGNOSIS — F17.200 NICOTINE DEPENDENCE, UNCOMPLICATED, UNSPECIFIED NICOTINE PRODUCT TYPE: Primary | ICD-10-CM

## 2025-06-13 DIAGNOSIS — K21.9 LARYNGOPHARYNGEAL REFLUX (LPR): ICD-10-CM

## 2025-06-13 PROCEDURE — 99999 PR PBB SHADOW E&M-EST. PATIENT-LVL III: CPT | Mod: PBBFAC,,, | Performed by: STUDENT IN AN ORGANIZED HEALTH CARE EDUCATION/TRAINING PROGRAM

## 2025-06-13 NOTE — PROGRESS NOTES
Chief complaint:    Chief Complaint   Patient presents with    Hoarse     Pt states she feels better but still sore on the left side of her throat            Referring Provider:  No referring provider defined for this encounter.    History of present illness:     Ms. Flores is a 59 y.o. presenting for evaluation of hoarseness.     Onset: years ago, but acutely worsened ~4-5 weeks ago when burning pine needles   Woke up next day with sore throat and voice has gotten worse since then  Severity: severe  Quality: hoarse, raspy and is worsening  The voice has been persistently hoarse.   Associated signs and symptoms:  odynophagia, globus   The patient denies neck mass, dysphagia, otalgia, unusual bleeding, or unintentional weight loss.   Smoking: Yes - many years   Prior medical therapy:  augmentin, oral antihistamine    Return clinic visit, 5/16/25  Completed steroids  Improved pain  Breathing has improved some since last week  She dose have heart burn, worse at night. Using rolaids PRN    Return clinic visit, 6/13/25  She has stopped smoking for 35 days  Breathing improved slightly, but voice not significant improved  Using the nicotine patches       History      Past Medical History:   Past Medical History:   Diagnosis Date    Anxiety     Hyperlipidemia     Psoriasis     Smoker          Past Surgical History:  Past Surgical History:   Procedure Laterality Date    UTERINE FIBROID SURGERY           Medications: Medication list reviewed. She  has a current medication list which includes the following prescription(s): aspirin, halobetasol, methylprednisolone, and pantoprazole.     Allergies:   Review of patient's allergies indicates:   Allergen Reactions    Crayfish Hives         Family history: family history includes Cancer in her father; Heart disease in her mother.         Social History          Alcohol use:  reports current alcohol use of about 2.0 standard drinks of alcohol per week.            Tobacco:  reports  "that she has quit smoking. Her smoking use included cigarettes. She has a 60 pack-year smoking history. She has never used smokeless tobacco.         Physical Examination      Vitals: Height 5' 4" (1.626 m), weight 91.4 kg (201 lb 8 oz).      General: Well developed, well nourished, well hydrated.    Voice: moderate to severe dysphonia, gravely, raspy, no dysarthria      Head/Face: Normocephalic, atraumatic. No scars or lesions. Facial musculature equal.     Eyes: No scleral icterus or conjunctival hemorrhage. EOMI. PERRLA.     Ears:     Right ear: No gross deformity. EAC is clear of debris and erythema. TM are intact with a pneumatized middle ear. No signs of retraction, fluid or infection.      Left ear: No gross deformity. EAC is clear of debris and erythema. TM are intact with a pneumatized middle ear. No signs of retraction, fluid or infection.      Nose: No gross deformity or lesions. No purulent discharge. No significant NSD.      Mouth/Oropharynx: Lips without any lesions. No mucosal lesions within the oropharynx. No tonsillar exudate or lesions. Pharyngeal walls symmetrical. Uvula midline. Tongue midline without lesions.     Neck: Trachea midline. No masses. No thyromegaly or nodules palpated.     Lymphatic: No lymphadenopathy in the neck.     Extremities: No cyanosis. Warm and well-perfused.     Skin: No scars or lesions on face or neck.      Neurologic: Moving all extremities without gross abnormality.CN II-XII grossly intact. House-Brackmann 1/6. No signs of nystagmus.          Data reviewed      Review of records:      I reviewed records from the referring provider's office visits, including the history, workup, and/or treatment of this problem thus far.      Imaging:      I have independently reviewed the following imaging with the findings noted below:     XR chest 4/29/25  No acute processes      Procedures:    Procedure -Transnasal fiberoptic laryngoscopy     Surgeon: Checo Andrade M.D. .  "     Anesthesia: topical 0.05% oxymetazoline with 4% lidocaine      Complications: None.     Description of Procedure: With the patient in the sitting position, topical lidocaine and oxymetazoline was applied to the nose. The scope was passed through the nose. Examination was carried out of the nose, nasopharynx, oropharynx, hypopharynx, and larynx with findings as noted above. Scope was removed. The patient tolerated the procedure well.      Findings: No masses or lesions in the nose, nasopharynx, oropharynx, hypopharynx, or larynx. Vocal fold abduction and adduction is normal, but there is severe bilateral rachelle's edema, with mild improvement. Resolved erythema. No pooling of secretions in the piriform sinuses, penetration, or aspiration.          Assessment/Plan:    No diagnosis found.        -     Polypoid Corditis (or Rachelle's Edema) - Pilar has severe swelling of the vocal folds on both sides.  Her swelling is due to smoking.  This swelling has given Velvet a low-pitched, husky/raspy voice.  She complains of little shortness of breath. Smoking cessation is critical to treat the Rachelles edema. If she continues to smoke, the swelling would return after a surgical intervention. Sometimes, smoking cessation is all that is needed to improve or resolve the swelling and hoarseness. Occasionally a surgery is necessary to remove the polypoid tissue from the vocal cords. Dictation #1  MRN:[id number]  CSN:[id number]     We will hve her do a steroid pack to help reduce the acute swelling and f/u in 1 week to ensure improvement and rule out underlying carcinoma.      Update 6/13/25  She has maintained smoking cessation, but has not had significant reduction of edema. We discussed that part of her SOB is likely at least in part due to her laryngeal edema, but she is interested in pulmonology evaluation to assess her lungs prior to surgery.    F/u with Dr. Larson once she arrives in August to discuss surgical  intervention        Checo Andrade MD  [facility]   [facility]  [address] [facility].  [place] [address]  P: [phone]  F: [phone]

## 2025-06-23 ENCOUNTER — HOSPITAL ENCOUNTER (OUTPATIENT)
Dept: RADIOLOGY | Facility: HOSPITAL | Age: 60
Discharge: HOME | End: 2025-06-23
Attending: NURSE PRACTITIONER
Payer: COMMERCIAL

## 2025-06-23 ENCOUNTER — OFFICE VISIT (OUTPATIENT)
Dept: INTERNAL MEDICINE | Facility: CLINIC | Age: 60
End: 2025-06-23
Payer: COMMERCIAL

## 2025-06-23 ENCOUNTER — TELEPHONE (OUTPATIENT)
Dept: CARDIOLOGY | Facility: CLINIC | Age: 60
End: 2025-06-23
Payer: COMMERCIAL

## 2025-06-23 VITALS
DIASTOLIC BLOOD PRESSURE: 90 MMHG | TEMPERATURE: 99 F | SYSTOLIC BLOOD PRESSURE: 160 MMHG | OXYGEN SATURATION: 97 % | BODY MASS INDEX: 36.13 KG/M2 | HEIGHT: 64 IN | HEART RATE: 93 BPM | WEIGHT: 211.63 LBS

## 2025-06-23 DIAGNOSIS — F32.1 CURRENT MODERATE EPISODE OF MAJOR DEPRESSIVE DISORDER WITHOUT PRIOR EPISODE: ICD-10-CM

## 2025-06-23 DIAGNOSIS — F41.9 ANXIETY: ICD-10-CM

## 2025-06-23 DIAGNOSIS — F17.200 SMOKER: ICD-10-CM

## 2025-06-23 DIAGNOSIS — M25.552 ACUTE HIP PAIN, LEFT: ICD-10-CM

## 2025-06-23 DIAGNOSIS — Z87.891 PERSONAL HISTORY OF NICOTINE DEPENDENCE: ICD-10-CM

## 2025-06-23 DIAGNOSIS — R06.09 DYSPNEA ON EXERTION: ICD-10-CM

## 2025-06-23 DIAGNOSIS — R60.0 BILATERAL LEG EDEMA: ICD-10-CM

## 2025-06-23 DIAGNOSIS — E66.812 CLASS 2 OBESITY WITHOUT SERIOUS COMORBIDITY WITH BODY MASS INDEX (BMI) OF 36.0 TO 36.9 IN ADULT, UNSPECIFIED OBESITY TYPE: ICD-10-CM

## 2025-06-23 DIAGNOSIS — M25.552 ACUTE HIP PAIN, LEFT: Primary | ICD-10-CM

## 2025-06-23 DIAGNOSIS — M54.42 ACUTE LEFT-SIDED LOW BACK PAIN WITH LEFT-SIDED SCIATICA: ICD-10-CM

## 2025-06-23 PROBLEM — E66.811 OBESITY, CLASS I, BMI 30.0-34.9 (SEE ACTUAL BMI): Status: RESOLVED | Noted: 2017-11-29 | Resolved: 2025-06-23

## 2025-06-23 PROCEDURE — 71271 CT THORAX LUNG CANCER SCR C-: CPT | Mod: 26,,, | Performed by: RADIOLOGY

## 2025-06-23 PROCEDURE — 73502 X-RAY EXAM HIP UNI 2-3 VIEWS: CPT | Mod: 26,LT,, | Performed by: RADIOLOGY

## 2025-06-23 PROCEDURE — 3044F HG A1C LEVEL LT 7.0%: CPT | Mod: CPTII,S$GLB,, | Performed by: NURSE PRACTITIONER

## 2025-06-23 PROCEDURE — G2211 COMPLEX E/M VISIT ADD ON: HCPCS | Mod: S$GLB,,, | Performed by: NURSE PRACTITIONER

## 2025-06-23 PROCEDURE — 99214 OFFICE O/P EST MOD 30 MIN: CPT | Mod: S$GLB,,, | Performed by: NURSE PRACTITIONER

## 2025-06-23 PROCEDURE — 3008F BODY MASS INDEX DOCD: CPT | Mod: CPTII,S$GLB,, | Performed by: NURSE PRACTITIONER

## 2025-06-23 PROCEDURE — 1160F RVW MEDS BY RX/DR IN RCRD: CPT | Mod: CPTII,S$GLB,, | Performed by: NURSE PRACTITIONER

## 2025-06-23 PROCEDURE — 73502 X-RAY EXAM HIP UNI 2-3 VIEWS: CPT | Mod: TC,PO,LT

## 2025-06-23 PROCEDURE — 3077F SYST BP >= 140 MM HG: CPT | Mod: CPTII,S$GLB,, | Performed by: NURSE PRACTITIONER

## 2025-06-23 PROCEDURE — 99999 PR PBB SHADOW E&M-EST. PATIENT-LVL IV: CPT | Mod: PBBFAC,,, | Performed by: NURSE PRACTITIONER

## 2025-06-23 PROCEDURE — 1159F MED LIST DOCD IN RCRD: CPT | Mod: CPTII,S$GLB,, | Performed by: NURSE PRACTITIONER

## 2025-06-23 PROCEDURE — 71271 CT THORAX LUNG CANCER SCR C-: CPT | Mod: TC,PO

## 2025-06-23 PROCEDURE — 3080F DIAST BP >= 90 MM HG: CPT | Mod: CPTII,S$GLB,, | Performed by: NURSE PRACTITIONER

## 2025-06-23 RX ORDER — BUPROPION HYDROCHLORIDE 150 MG/1
150 TABLET ORAL DAILY
Qty: 30 TABLET | Refills: 0 | Status: SHIPPED | OUTPATIENT
Start: 2025-06-23

## 2025-06-23 RX ORDER — NABUMENTONE 750 MG/1
750 TABLET ORAL 2 TIMES DAILY PRN
Qty: 30 TABLET | Refills: 0 | Status: SHIPPED | OUTPATIENT
Start: 2025-06-23

## 2025-06-23 NOTE — TELEPHONE ENCOUNTER
Offered sooner appt in [place] at [facility] and on and pt declined she only wants to be seen in [place], appt made as she requested pbr

## 2025-06-23 NOTE — PROGRESS NOTES
Subjective:       Patient ID: Pilar Flores is a 59 y.o. female.    Chief Complaint: Hip Pain and Leg Swelling      History of Present Illness    CHIEF COMPLAINT:  - Ms. Flores presents with shortness of breath, back and hip pain, and leg swelling (edema).    HPI:  Ms. Flores reports ongoing shortness of breath, which was particularly severe about 10 days ago with significant respiratory distress. While it has improved since then, possibly due to steroid treatment (from ENT), she still has shortness of breath, especially when ascending stairs. She also complains of back pain and left hip pain that started last week. The hip pain is localized to the joint, exacerbated when bearing weight, but manageable when sitting. She mentions a longstanding knot in her back (present for about 30 years) that has recently become painful, especially when performing activities like washing dishes or carrying groceries. The pain radiates into her left buttock.    She reports leg swelling that began on Saturday, which she describes as the most severe edema she has ever had. She has gained 10 lbs since her last visit 10 days ago and reports continuous weight gain. She recently quit smoking 45 days ago and is using nicotine replacement therapy.     She was evaluated by an ENT for throat problems, which were attributed to inflammation or edema of the vocal cords. She has an upcoming appointment with a pulmonologist next Monday for further evaluation of her shortness of breath.    She denies monitoring her blood pressure at home.        Problem List[1]      Past Surgical History:   Procedure Laterality Date    UTERINE FIBROID SURGERY         Current Medications[2]    Review of Systems   Constitutional:  Negative for appetite change, chills, fatigue and fever.   HENT:  Positive for voice change. Negative for postnasal drip, rhinorrhea, sinus pressure, sinus pain, sneezing and sore throat.    Respiratory:  Positive for shortness of breath.  "Negative for cough and wheezing.    Cardiovascular:  Negative for chest pain and palpitations.   Gastrointestinal:  Negative for abdominal pain, diarrhea, nausea and vomiting.   Musculoskeletal:  Positive for arthralgias, back pain and gait problem.   Neurological:  Negative for dizziness, light-headedness and headaches.       Objective:   BP (!) 160/90 (BP Location: Left arm, Patient Position: Sitting)   Pulse 93   Temp 98.7 °F (37.1 °C) (Tympanic)   Ht 5' 4" (1.626 m)   Wt 96 kg (211 lb 10.3 oz)   SpO2 97%   BMI 36.33 kg/m²      Physical Exam  Constitutional:       General: She is not in acute distress.     Appearance: Normal appearance. She is obese. She is not ill-appearing.   Cardiovascular:      Rate and Rhythm: Normal rate and regular rhythm.      Pulses: Normal pulses.      Heart sounds: Normal heart sounds. No murmur heard.     No friction rub. No gallop.   Pulmonary:      Effort: Pulmonary effort is normal. No respiratory distress.      Breath sounds: Normal breath sounds. No wheezing.   Abdominal:      Palpations: Abdomen is soft.      Tenderness: There is no abdominal tenderness. There is no guarding.   Musculoskeletal:      Left hip: Tenderness present. Decreased range of motion.      Right lower leg: Edema (1+, pitting) present.      Left lower leg: Edema (1+ pitting) present.   Skin:     General: Skin is warm and dry.      Coloration: Skin is not pale.      Findings: No erythema.   Neurological:      Mental Status: She is alert and oriented to person, place, and time.   Psychiatric:         Mood and Affect: Mood normal.         Behavior: Behavior normal.         Assessment & Plan     Problem List Items Addressed This Visit          Psychiatric    Anxiety    Current Assessment & Plan   Stable without medications          Current moderate episode of major depressive disorder without prior episode    Current Assessment & Plan   Would like to retry wellbutrin.          Relevant Medications    " buPROPion (WELLBUTRIN XL) 150 MG TB24 tablet       Endocrine    Class 2 obesity with body mass index (BMI) of 36.0 to 36.9 in adult       Other    Smoker    Current Assessment & Plan   Day 45 of quitting smoking. Using nicotine replacements. Would like to try wellbutrin as well.          Relevant Medications    buPROPion (WELLBUTRIN XL) 150 MG TB24 tablet    Other Relevant Orders    CT Chest Lung Screening Low Dose (Completed)     Other Visit Diagnoses         Acute hip pain, left    -  Primary    Relevant Medications    nabumetone (RELAFEN) 750 MG tablet    Other Relevant Orders    X-Ray Hip 2 or 3 views Left with Pelvis when performed (Completed)      Bilateral leg edema        Relevant Orders    Echo    Basic Metabolic Panel (Completed)    BNP (Completed)      Personal history of nicotine dependence        Relevant Orders    CT Chest Lung Screening Low Dose (Completed)      Dyspnea on exertion        Relevant Orders    Echo    Basic Metabolic Panel (Completed)    BNP (Completed)    CBC Auto Differential (Completed)    Ambulatory referral/consult to Cardiology      Acute left-sided low back pain with left-sided sciatica        Relevant Medications    nabumetone (RELAFEN) 750 MG tablet            Assessment & Plan    MEDICAL DECISION MAKING:  - BP remains elevated.  - Evaluated shortness of breath, considering potential COPD exacerbation and cardiac causes.  - Assessed 10-pound weight gain and leg edema, considering possible heart failure.  - Evaluated left hip and back pain, suspecting musculoskeletal issues.    MEDICATIONS:  - Started nabumetone, to be taken twice daily as needed for left side back and hip pain.  - Started Wellbutrin for 1 month to aid in smoking cessation.    ORDERS:  - Ordered XR Left Hip.  - Ordered CT Chest for lung screening due to long-term smoking history.  - Ordered BMP to guide potential lasix dosage and assess renal function.  - Ordered echocardiogram.    REFERRALS:  - Referred to  "cardiology.    FOLLOW UP:  - Follow up with pulmonology appointment scheduled for next Monday.  - Will attempt to schedule cardiology appointment on the same day as pulmonology appointment to save travel time.        Follow up if symptoms fail to improve, 2 week nurse visit for BP check, and in 6 month for routine follow up.     Visit today included increased complexity associated with the care of the episodic problem  addressed and managing the longitudinal care of the patient due to the serious and/or complex managed problem(s) .        Portions of this note may have been created with voice recognition software. Occasional "wrong-word" or "sound-a-like" substitutions may have occurred due to the inherent limitations of voice recognition software. Please, read the note carefully and recognize, using context, where substitutions have occurred.       This note was generated with the assistance of ambient listening technology. Verbal consent was obtained by the patient and accompanying visitor(s) for the recording of patient appointment to facilitate this note. I attest to having reviewed and edited the generated note for accuracy, though some syntax or spelling errors may persist. Please contact the author of this note for any clarification.            [1]   Patient Active Problem List  Diagnosis    Smoker    Hyperlipidemia    Psoriasis    Anxiety    Current moderate episode of major depressive disorder without prior episode    Fatigue    Class 2 obesity with body mass index (BMI) of 36.0 to 36.9 in adult    Pulmonary emphysema    Aortic atherosclerosis   [2]   Current Outpatient Medications:     halobetasol (ULTRAVATE) 0.05 % cream, Apply topically 2 (two) times daily as needed., Disp: 50 g, Rfl: 2    pantoprazole (PROTONIX) 40 MG tablet, Take 1 tablet (40 mg total) by mouth once daily., Disp: 90 tablet, Rfl: 0    buPROPion (WELLBUTRIN XL) 150 MG TB24 tablet, Take 1 tablet (150 mg total) by mouth once daily., Disp: " 30 tablet, Rfl: 0    nabumetone (RELAFEN) 750 MG tablet, Take 1 tablet (750 mg total) by mouth 2 (two) times daily as needed., Disp: 30 tablet, Rfl: 0

## 2025-06-24 ENCOUNTER — PATIENT MESSAGE (OUTPATIENT)
Dept: INTERNAL MEDICINE | Facility: CLINIC | Age: 60
End: 2025-06-24
Payer: COMMERCIAL

## 2025-06-24 ENCOUNTER — RESULTS FOLLOW-UP (OUTPATIENT)
Dept: INTERNAL MEDICINE | Facility: CLINIC | Age: 60
End: 2025-06-24

## 2025-06-24 DIAGNOSIS — I70.0 AORTIC ATHEROSCLEROSIS: ICD-10-CM

## 2025-06-24 DIAGNOSIS — J43.9 PULMONARY EMPHYSEMA, UNSPECIFIED EMPHYSEMA TYPE: Primary | ICD-10-CM

## 2025-06-24 RX ORDER — FUROSEMIDE 20 MG/1
20 TABLET ORAL DAILY PRN
Qty: 30 TABLET | Refills: 0 | Status: SHIPPED | OUTPATIENT
Start: 2025-06-24

## 2025-06-27 ENCOUNTER — PATIENT MESSAGE (OUTPATIENT)
Dept: CARDIOLOGY | Facility: HOSPITAL | Age: 60
End: 2025-06-27
Payer: COMMERCIAL

## 2025-06-27 ENCOUNTER — PATIENT MESSAGE (OUTPATIENT)
Dept: INTERNAL MEDICINE | Facility: CLINIC | Age: 60
End: 2025-06-27
Payer: COMMERCIAL

## 2025-06-27 DIAGNOSIS — M25.552 ACUTE HIP PAIN, LEFT: Primary | ICD-10-CM

## 2025-06-30 ENCOUNTER — PATIENT MESSAGE (OUTPATIENT)
Dept: CARDIOLOGY | Facility: HOSPITAL | Age: 60
End: 2025-06-30
Payer: COMMERCIAL

## 2025-07-01 RX ORDER — MELOXICAM 7.5 MG/1
7.5 TABLET ORAL DAILY PRN
Qty: 30 TABLET | Refills: 5 | Status: SHIPPED | OUTPATIENT
Start: 2025-07-01

## 2025-07-11 ENCOUNTER — OFFICE VISIT (OUTPATIENT)
Dept: PULMONOLOGY | Facility: CLINIC | Age: 60
End: 2025-07-11
Payer: COMMERCIAL

## 2025-07-11 VITALS
DIASTOLIC BLOOD PRESSURE: 82 MMHG | BODY MASS INDEX: 35.08 KG/M2 | SYSTOLIC BLOOD PRESSURE: 158 MMHG | HEIGHT: 64 IN | WEIGHT: 205.5 LBS | HEART RATE: 97 BPM | OXYGEN SATURATION: 98 %

## 2025-07-11 DIAGNOSIS — G47.8 NON-RESTORATIVE SLEEP: ICD-10-CM

## 2025-07-11 DIAGNOSIS — R06.83 SNORING: ICD-10-CM

## 2025-07-11 DIAGNOSIS — K21.9 LARYNGOPHARYNGEAL REFLUX (LPR): ICD-10-CM

## 2025-07-11 DIAGNOSIS — J38.1 REINKE'S EDEMA OF VOCAL FOLDS: ICD-10-CM

## 2025-07-11 DIAGNOSIS — Z87.891 HISTORY OF SMOKING 30 OR MORE PACK YEARS: ICD-10-CM

## 2025-07-11 DIAGNOSIS — J43.2 CENTRILOBULAR EMPHYSEMA: Primary | ICD-10-CM

## 2025-07-11 DIAGNOSIS — F17.200 NICOTINE DEPENDENCE, UNCOMPLICATED, UNSPECIFIED NICOTINE PRODUCT TYPE: ICD-10-CM

## 2025-07-11 PROCEDURE — 99999 PR PBB SHADOW E&M-EST. PATIENT-LVL IV: CPT | Mod: PBBFAC,,, | Performed by: INTERNAL MEDICINE

## 2025-07-11 RX ORDER — ALBUTEROL SULFATE 90 UG/1
2 INHALANT RESPIRATORY (INHALATION) EVERY 6 HOURS PRN
Qty: 18 G | Refills: 5 | Status: SHIPPED | OUTPATIENT
Start: 2025-07-11 | End: 2026-07-11

## 2025-07-11 NOTE — PATIENT INSTRUCTIONS
Brand Names: US  Mounjaro;     Zepbound    Brand Names: Ambrosio  Mounjaro;     Mounjaro Kwikpen    Warning  This drug has been shown to cause thyroid cancer in some animals. It is not known if this happens in humans. If thyroid cancer happens, it may be deadly if not found and treated early. Call your doctor right away if you have a neck mass, trouble breathing, trouble swallowing, or have hoarseness that will not go away.  Do not use this drug if you have a health problem called Multiple Endocrine Neoplasia syndrome type 2 (MEN 2), or if you or a family member have had thyroid cancer.  Have your blood work checked and thyroid ultrasounds as you have been told by your doctor.    What is this drug used for?  It is used to help control blood sugar in people with type 2 diabetes.  It is used to help with weight loss in certain people.    What do I need to tell my doctor BEFORE I take this drug?  All products:  If you are allergic to this drug; any part of this drug; or any other drugs, foods, or substances. Tell your doctor about the allergy and what signs you had.  If you are allergic to benzyl alcohol. Some products have benzyl alcohol.  If you have ever had pancreatitis.  If you have stomach or bowel problems.  If you are using another drug that has the same drug in it.  If you are using another drug like this one. If you are not sure, ask your doctor or pharmacist.  If you are using this drug for diabetes:  If you have type 1 diabetes. Do not use this drug to treat type 1 diabetes.  If you have an acidic blood problem.  Zepbound:  If you have or have ever had depression or thoughts of suicide.  This is not a list of all drugs or health problems that interact with this drug.  Tell your doctor and pharmacist about all of your drugs (prescription or OTC, natural products, vitamins) and health problems. You must check to make sure that it is safe for you to take this drug with all of your drugs and health problems. Do  not start, stop, or change the dose of any drug without checking with your doctor.    What are some things I need to know or do while I take this drug?  All products:  Tell all of your health care providers that you take this drug. This includes your doctors, nurses, pharmacists, and dentists.  Follow the diet and exercise plan that your doctor told you about.  Talk with your doctor before you drink alcohol.  Birth control pills may not work as well to prevent pregnancy. If you take birth control pills, you may need to switch to another type of hormone-based birth control like a vaginal ring if your doctor tells you to. If another type of hormone-based birth control is not an option, use some other kind of birth control also, like a condom. Do this for 4 weeks after starting this drug and for 4 weeks each time the dose is raised.  This drug may prevent other drugs taken by mouth from getting into the body. If you take other drugs by mouth, you may need to take them at some other time than this drug. Talk with your doctor.  Do not share with another person even if the needle has been changed. Sharing your tray or pen may pass infections from one person to another. This includes infections you may not know you have.  If you cannot drink liquids by mouth or if you have upset stomach, throwing up, or diarrhea that does not go away, you need to avoid getting dehydrated. Contact your doctor to find out what to do. Dehydration may lead to low blood pressure or to new or worsening kidney problems.  If you will be having any surgery or procedure that uses anesthesia or deep sedation, talk with your doctor. This drug may raise the risk of food getting into the lungs during this type of surgery or other procedure.  A severe and sometimes deadly pancreas problem (pancreatitis) has happened with other drugs like this one.  If you are using this drug for diabetes:  It may be harder to control blood sugar during times of stress  such as fever, infection, injury, or surgery. A change in physical activity, exercise, or diet may also affect blood sugar.  Check your blood sugar as you have been told by your doctor.  Do not drive if your blood sugar has been low. There is a greater chance of you having a crash.  Wear disease medical alert ID (identification).  Tell your doctor if you are pregnant, plan on getting pregnant, or are breast-feeding. You will need to talk about the benefits and risks to you and the baby.  If using for weight loss:  If you have high blood sugar (diabetes), you will need to watch your blood sugar closely.  Weight loss during pregnancy may cause harm to the unborn baby. If you get pregnant while taking this drug or if you want to get pregnant, call your doctor right away.  Tell your doctor if you are breast-feeding. You will need to talk about any risks to your baby.    What are some side effects that I need to call my doctor about right away?  WARNING/CAUTION: Even though it may be rare, some people may have very bad and sometimes deadly side effects when taking a drug. Tell your doctor or get medical help right away if you have any of the following signs or symptoms that may be related to a very bad side effect:  All products:  Signs of an allergic reaction, like rash; hives; itching; red, swollen, blistered, or peeling skin with or without fever; wheezing; tightness in the chest or throat; trouble breathing, swallowing, or talking; unusual hoarseness; or swelling of the mouth, face, lips, tongue, or throat.  Signs of kidney problems like unable to pass urine, change in how much urine is passed, blood in the urine, or a big weight gain.  Signs of gallbladder problems like pain in the upper right belly area, right shoulder area, or between the shoulder blades; yellow skin or eyes; fever with chills; bloating; or very upset stomach or throwing up.  Signs of a pancreas problem (pancreatitis) like very bad stomach pain,  very bad back pain, or very bad upset stomach or throwing up.  Dizziness or passing out.  A fast heartbeat.  Change in eyesight.  Feeling anxious or irritable.  Low blood sugar can happen. The chance may be raised when this drug is used with other drugs for diabetes. Signs may be dizziness, headache, feeling sleepy or weak, shaking, fast heartbeat, confusion, hunger, or sweating. Call your doctor right away if you have any of these signs. Follow what you have been told to do for low blood sugar. This may include taking glucose tablets, liquid glucose, or some fruit juices.  Zepbound:  New or worse behavior or mood changes like depression or thoughts of suicide.    What are some other side effects of this drug?  All drugs may cause side effects. However, many people have no side effects or only have minor side effects. Call your doctor or get medical help if any of these side effects or any other side effects bother you or do not go away:  All products:  Constipation, diarrhea, stomach pain, upset stomach, throwing up, or decreased appetite.  Heartburn.  Pain, itching, or other irritation where the injection was given.  Zepbound:  Feeling tired or weak.  Hair loss.  Burping.  These are not all of the side effects that may occur. If you have questions about side effects, call your doctor. Call your doctor for medical advice about side effects.  You may report side effects to your national health agency.    How is this drug best taken?  Use this drug as ordered by your doctor. Read all information given to you. Follow all instructions closely.  All products:  It is given as a shot into the fatty part of the skin on the top of the thigh, belly area, or upper arm.  If you will be giving yourself the shot, your doctor or nurse will teach you how to give the shot.  Keep taking this drug as you have been told by your doctor or other health care provider, even if you feel well.  Take the same day each week.  Move site where  you give the shot each time.  Take with or without food.  Wash your hands before and after use.  Do not use if the solution is leaking or has particles.  This drug is colorless to a faint yellow. Do not use if the solution changes color.  Throw away needles in a needle/sharp disposal box. Do not reuse needles or other items. When the box is full, follow all local rules for getting rid of it. Talk with a doctor or pharmacist if you have any questions.  Vials:  It is important to have the right syringe to measure your dose. If you do not have the right syringe or you are not sure, talk with your pharmacist.  Each vial is for 1 use only. Throw away any part of the used vial after the dose is given.  Prefilled pen:  Do not move this drug from the pen to a syringe.  If you drop this drug on a hard surface, do not use it.  Single-dose prefilled pen:  Each pen is for 1 use only. Throw away any part of the used pen after the dose is given.  If you are using this drug for diabetes:  If you are also using insulin, you may inject this drug and the insulin in the same area of the body but not right next to each other.  Do not mix this drug in the same syringe with insulin.    What do I do if I miss a dose?  If it is within 4 days after the missed dose, take the missed dose and go back to your normal day.  If it has been more than 4 days since the missed dose, skip the missed dose and go back to your normal day.  Do not take 2 doses at the same time or extra doses.    How do I store and/or throw out this drug?  All products:  Store in a refrigerator. Do not freeze.  Do not use if it has been frozen.  Protect from heat.  Store in the original container to protect from light.  Keep all drugs in a safe place. Keep all drugs out of the reach of children and pets.  Throw away unused or  drugs. Do not flush down a toilet or pour down a drain unless you are told to do so. Check with your pharmacist if you have questions about  the best way to throw out drugs. There may be drug take-back programs in your area.  Zepbound:  If needed, each pen or vial may be stored at room temperature for up to 21 days. If you store at room temperature, throw away any part not used after 21 days.  Do not put this drug back in the refrigerator after it has been stored at room temperature.  Mounjaro (single-dose pens and vials):  If needed, each pen or vial may be stored at room temperature for up to 21 days. If you store at room temperature, throw away any part not used after 21 days.  Mounjaro (multi-dose pens):  If needed, you may store at room temperature for up to 30 days. Write down the date you take this drug out of the refrigerator. If stored at room temperature and not used within 30 days, throw this drug away.    General drug facts  If your symptoms or health problems do not get better or if they become worse, call your doctor.  Do not share your drugs with others and do not take anyone else's drugs.  Some drugs may have another patient information leaflet. If you have any questions about this drug, please talk with your doctor, nurse, pharmacist, or other health care provider.  If you think there has been an overdose, call your poison control center or get medical care right away. Be ready to tell or show what was taken, how much, and when it happened.    Last Reviewed Date  2024-11-08  Consumer Information Use and Disclaimer  This generalized information is a limited summary of diagnosis, treatment, and/or medication information. It is not meant to be comprehensive and should be used as a tool to help the user understand and/or assess potential diagnostic and treatment options. It does NOT include all information about conditions, treatments, medications, side effects, or risks that may apply to a specific patient. It is not intended to be medical advice or a substitute for the medical advice, diagnosis, or treatment of a health care provider based  on the health care provider's examination and assessment of a patient's specific and unique circumstances. Patients must speak with a health care provider for complete information about their health, medical questions, and treatment options, including any risks or benefits regarding use of medications. This information does not endorse any treatments or medications as safe, effective, or approved for treating a specific patient. [employer] and its affiliates disclaim any warranty or liability relating to this information or the use thereof. The use of this information is governed by the Terms of Use, available at https://www.[employer].com/en/know/clinical-effectiveness-terms.    © 2025 [employer] and its affiliates and/or licensors. All rights reserved.  Use of [employer] is subject to the Terms of Use.

## 2025-07-11 NOTE — PROGRESS NOTES
Initial Outpatient Pulmonary Evaluation       SUBJECTIVE:     Chief Complaint   Patient presents with    [facility]    Shortness of Breath       History of Present Illness:    Patient is a 59 y.o. female     History of Present Illness    CHIEF COMPLAINT:  Patient presents today for evaluation of shortness of breath and vocal cord issues.    RESPIRATORY SYMPTOMS:  She reports persistent shortness of breath with activities, including showering, climbing stairs, sitting, and dressing, with increased respiratory distress in humid environments. She notes sensation of phlegm in throat near vocal cords but denies productive cough or ability to clear throat effectively. She requires opening shower curtain to obtain fresh air during bathing. She currently denies using any respiratory medications such as inhalers.    SMOKING HISTORY:  She has smoked since age 11 and quit 2 months ago after approximately 25+ years. She has had multiple previous quit attempts, including one successful attempt lasting approximately one year, and other shorter intervals of 2-3 months. She has participated in the [facility] cessation program within the last five years and is currently enrolled in another smoking cessation program focusing on relaxation and meditation techniques. She currently uses nicotine gum and nicotine patch as cessation aids.    MEDICAL HISTORY:  She has a history of spontaneous pneumothorax in her early 20s. Recent ENT evaluation revealed vocal cord edema, which improved with one week of tapering steroids.    FATIGUE:  She reports significant fatigue for at least the past year, feeling consistently tired and experiencing exhaustion with minimal exertion.    MUSCULOSKELETAL:  She reports left hip pain that started approximately one month ago, with pain on initial steps that improves with continued walking. X-rays showed mild osteoarthritis in both hips. She denies any right hip pain and  "has scheduled an upcoming orthopedic consultation.    WEIGHT MANAGEMENT:  She reports weight gain of 50 lbs over the last 2-3 years, which has impacted her mobility and ability to walk.         Low-dose CT scan of the chest 2025 no large size concerning nodules  Review of Systems   Constitutional:  Negative for fever.   HENT:  Positive for voice change and congestion. Negative for nosebleeds.    Respiratory:  Positive for snoring, cough, shortness of breath and wheezing.    Psychiatric/Behavioral:  Negative for confusion.        Review of patient's allergies indicates:   Allergen Reactions    Crayfish Hives       Current Medications[1]    Past Medical History:   Diagnosis Date    Anxiety     Hyperlipidemia     Psoriasis     Smoker      Past Surgical History:   Procedure Laterality Date    UTERINE FIBROID SURGERY       Family History   Problem Relation Name Age of Onset    Heart disease Mother      Cancer Father       Social History[2]       OBJECTIVE:     Vital Signs (Most Recent)  Vital Signs  Pulse: 97  SpO2: 98 %  BP: (!) 158/82  BP Location: Left arm  Patient Position: Sitting  Pain Score:   6  Pain Loc: Hip (left hip per patient)  Height and Weight  Height: 5' 4" (162.6 cm)  Weight: 93.2 kg (205 lb 7.5 oz)  BSA (Calculated - sq m): 2.05 sq meters  BMI (Calculated): 35.3  Weight in (lb) to have BMI = 25: 145.3]  Wt Readings from Last 2 Encounters:   07/11/25 93.2 kg (205 lb 7.5 oz)   06/23/25 96 kg (211 lb 10.3 oz)         Physical Exam:  Physical Exam   Constitutional: She is oriented to person, place, and time. She appears well-developed. No distress. She is obese.   Pulmonary/Chest: Normal expansion and effort normal. No stridor. No respiratory distress. She has decreased breath sounds. She has no wheezes. She has no rhonchi.   Neurological: She is alert and oriented to person, place, and time.   Psychiatric: She has a normal mood and affect. Her behavior is normal. Judgment and thought content normal. " "  Nursing note and vitals reviewed.      Laboratory  Lab Results   Component Value Date    WBC 9.20 06/23/2025    RBC 3.89 (L) 06/23/2025    HGB 13.0 06/23/2025    HCT 37.9 06/23/2025    MCV 97 06/23/2025    MCH 33.4 (H) 06/23/2025    MCHC 34.3 06/23/2025    RDW 12.2 06/23/2025     06/23/2025    MPV 10.7 06/23/2025    GRAN 11.5 (H) 08/10/2022    GRAN 76.4 (H) 08/10/2022    LYMPH 22.2 06/23/2025    LYMPH 2.04 06/23/2025    MONO 9.2 06/23/2025    MONO 0.85 06/23/2025    EOS 1.7 06/23/2025    EOS 0.16 06/23/2025    BASO 0.07 08/10/2022    EOSINOPHIL 1.1 08/10/2022    BASOPHIL 0.5 06/23/2025    BASOPHIL 0.05 06/23/2025       BMP  Lab Results   Component Value Date     06/23/2025    K 4.1 06/23/2025     06/23/2025    CO2 25 06/23/2025    BUN 17 06/23/2025    CREATININE 0.7 06/23/2025    CALCIUM 9.7 06/23/2025    ANIONGAP 14 06/23/2025    ESTGFRAFRICA >60 11/29/2017    EGFRNONAA >60 11/29/2017    AST 21 04/28/2025    ALT 34 04/28/2025    PROT 7.3 04/28/2025       Lab Results   Component Value Date    BNP 41 06/23/2025    BNP 23 04/28/2025       Lab Results   Component Value Date    TSH 1.816 08/10/2022       No results found for: "SEDRATE"    No results found for: "CRP"    No results found for: "IGE"    No results found for: "ASPERGILLUS"  No results found for: "AFUMIGATUSCL"     No results found for: "ACE"    Diagnostic Results:  I have personally reviewed today the following studies :    AS ABOVE    ASSESSMENT/PLAN:   Assessment & Plan    J43.2 Centrilobular emphysema  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  - Noted diminished air entry consistent with emphysema diagnosis.  - Evaluated recent ENT findings " of vocal cord edema and recommendation for surgery.  - Discussed relationship between significant sleep apnea, obesity, and lung function.    CENTRILOBULAR EMPHYSEMA:  - Explained that emphysema from smoking will not disappear but lung function can be improved.  - Started albuterol inhaler as needed for coughing, wheezing, and shortness of breath.  - Ordered pulmonary function test and walking test (to be scheduled after July 15th) to assess lung capacity.  - Follow up in 3 months.  - Nurse will schedule testing appointments.  - Appointments will be communicated via [facility] [facility].    NICOTINE DEPENDENCE, UNCOMPLICATED, UNSPECIFIED NICOTINE PRODUCT TYPE:  - Patient to continue to abstain from smoking.    HISTORY OF SMOKING 30 OR MORE PACK YEARS:  - Explained that emphysema from smoking will not disappear but lung function can be improved.    SNORING:  - Ordered home sleep apnea test.    NON-RESTORATIVE SLEEP:  - Provided information about Zepbound as potential treatment for significant sleep apnea and obesity.  - Ordered home sleep apnea test.  - Patient to manage weight.         Centrilobular emphysema  -     Complete PFT with bronchodilator; Future  -     Stress test, pulmonary; Future  -     albuterol (PROVENTIL/VENTOLIN HFA) 90 mcg/actuation inhaler; Inhale 2 puffs into the lungs every 6 (six) hours as needed for Wheezing or Shortness of Breath (cough). Rescue  Dispense: 18 g; Refill: 5    Rachelle's edema of vocal folds  -     Ambulatory referral/consult to Pulmonology    Laryngopharyngeal reflux (LPR)  -     Ambulatory referral/consult to Pulmonology    Nicotine dependence, uncomplicated, unspecified nicotine product type  -     Ambulatory referral/consult to Pulmonology    History of smoking 30 or more pack years  -     CT Chest Lung Screening Low Dose; Future; Expected date: 07/11/2026    Snoring  -     Home Sleep Study; Future    Non-restorative sleep  -     Home Sleep Study; Future        Follow up in about 3  months (around 10/11/2025).    This note was prepared using voice recognition system and is likely to have sound alike errors that may have been overlooked even after proof reading.  Please call me with any questions    Discussed diagnosis, its evaluation, treatment and usual course. All questions answered.    Thank you for the courtesy of participating in the care of this patient    Ruben Davila MD             [1]   Current Outpatient Medications   Medication Sig Dispense Refill    buPROPion (WELLBUTRIN XL) 150 MG TB24 tablet Take 1 tablet (150 mg total) by mouth once daily. 30 tablet 0    furosemide (LASIX) 20 MG tablet Take 1 tablet (20 mg total) by mouth daily as needed (leg swelling). 30 tablet 0    halobetasol (ULTRAVATE) 0.05 % cream Apply topically 2 (two) times daily as needed. 50 g 2    meloxicam (MOBIC) 7.5 MG tablet Take 1 tablet (7.5 mg total) by mouth daily as needed for Pain. 30 tablet 5    pantoprazole (PROTONIX) 40 MG tablet Take 1 tablet (40 mg total) by mouth once daily. 90 tablet 0    albuterol (PROVENTIL/VENTOLIN HFA) 90 mcg/actuation inhaler Inhale 2 puffs into the lungs every 6 (six) hours as needed for Wheezing or Shortness of Breath (cough). Rescue 18 g 5     No current facility-administered medications for this visit.   [2]   Social History  Tobacco Use    Smoking status: Former     Current packs/day: 1.50     Average packs/day: 1.5 packs/day for 40.0 years (60.0 ttl pk-yrs)     Types: Cigarettes    Smokeless tobacco: Never    Tobacco comments:     She hasn't smoked in 7 days.   Vaping Use    Vaping status: Never Used   Substance Use Topics    Alcohol use: Yes     Alcohol/week: 2.0 standard drinks of alcohol     Types: 1 Glasses of wine, 1 Shots of liquor per week    Drug use: No

## 2025-07-15 ENCOUNTER — HOSPITAL ENCOUNTER (OUTPATIENT)
Dept: RADIOLOGY | Facility: HOSPITAL | Age: 60
Discharge: HOME | End: 2025-07-15
Attending: NURSE PRACTITIONER
Payer: COMMERCIAL

## 2025-07-15 ENCOUNTER — OFFICE VISIT (OUTPATIENT)
Dept: ORTHOPEDICS | Facility: CLINIC | Age: 60
End: 2025-07-15
Payer: COMMERCIAL

## 2025-07-15 VITALS — WEIGHT: 205.5 LBS | BODY MASS INDEX: 35.08 KG/M2 | HEIGHT: 64 IN

## 2025-07-15 DIAGNOSIS — M25.552 LEFT HIP PAIN: ICD-10-CM

## 2025-07-15 DIAGNOSIS — M54.9 BACK PAIN, UNSPECIFIED BACK LOCATION, UNSPECIFIED BACK PAIN LATERALITY, UNSPECIFIED CHRONICITY: Primary | ICD-10-CM

## 2025-07-15 DIAGNOSIS — M16.0 BILATERAL PRIMARY OSTEOARTHRITIS OF HIP: Primary | ICD-10-CM

## 2025-07-15 DIAGNOSIS — M54.50 LUMBAR PAIN: ICD-10-CM

## 2025-07-15 DIAGNOSIS — M47.816 SPONDYLOSIS OF LUMBAR REGION WITHOUT MYELOPATHY OR RADICULOPATHY: ICD-10-CM

## 2025-07-15 DIAGNOSIS — M54.9 BACK PAIN, UNSPECIFIED BACK LOCATION, UNSPECIFIED BACK PAIN LATERALITY, UNSPECIFIED CHRONICITY: ICD-10-CM

## 2025-07-15 PROCEDURE — 72100 X-RAY EXAM L-S SPINE 2/3 VWS: CPT | Mod: 26,,, | Performed by: STUDENT IN AN ORGANIZED HEALTH CARE EDUCATION/TRAINING PROGRAM

## 2025-07-15 PROCEDURE — 1159F MED LIST DOCD IN RCRD: CPT | Mod: CPTII,S$GLB,, | Performed by: NURSE PRACTITIONER

## 2025-07-15 PROCEDURE — 3044F HG A1C LEVEL LT 7.0%: CPT | Mod: CPTII,S$GLB,, | Performed by: NURSE PRACTITIONER

## 2025-07-15 PROCEDURE — 72100 X-RAY EXAM L-S SPINE 2/3 VWS: CPT | Mod: TC

## 2025-07-15 PROCEDURE — 3008F BODY MASS INDEX DOCD: CPT | Mod: CPTII,S$GLB,, | Performed by: NURSE PRACTITIONER

## 2025-07-15 PROCEDURE — 99999 PR PBB SHADOW E&M-EST. PATIENT-LVL III: CPT | Mod: PBBFAC,,, | Performed by: NURSE PRACTITIONER

## 2025-07-15 PROCEDURE — 99204 OFFICE O/P NEW MOD 45 MIN: CPT | Mod: S$GLB,,, | Performed by: NURSE PRACTITIONER

## 2025-07-15 PROCEDURE — G2211 COMPLEX E/M VISIT ADD ON: HCPCS | Mod: S$GLB,,, | Performed by: NURSE PRACTITIONER

## 2025-07-15 RX ORDER — GABAPENTIN 300 MG/1
300 CAPSULE ORAL NIGHTLY
Qty: 60 CAPSULE | Refills: 1 | Status: SHIPPED | OUTPATIENT
Start: 2025-07-15

## 2025-07-15 RX ORDER — CELECOXIB 200 MG/1
200 CAPSULE ORAL 2 TIMES DAILY
Qty: 60 CAPSULE | Refills: 1 | Status: SHIPPED | OUTPATIENT
Start: 2025-07-15

## 2025-07-15 NOTE — PROGRESS NOTES
Ghislaine Juarez NP  [facility] [place]          Chief Complaint:   Chief Complaint   Patient presents with    Left Hip - Pain, Numbness    Lower Back - Pain        History of Present Illness: Pilar Flores is a 59 y.o. female       She also complains of back pain and left hip pain that has been present for about 4-6 weeks. The hip pain is localized to the Left anterior groin area and exacerbated when bearing weight, but manageable when sitting.  It often hurts to move the leg out and in.  She mentions a longstanding knot in her back (present for about 30 years) that has recently become painful, especially when performing activities like washing dishes or carrying groceries. The pain radiates into her left buttock.  The pain in her back feels like a burning sensation.    She does have difficulty swallowing she is prescribed a medication she can not recall the name of but then was switched to meloxicam which has not been helping.  She took 1 of her friends gabapentin capsule.  She does not know the strength of the medication but when she took it it actually helped relieve her symptoms.    ROS:   Neuro: Awake, alert and oriented  Pulm: no resp distress  Muscloskeletal:  Left hip and low back pain    Past Medical History:   Past Medical History:   Diagnosis Date    Anxiety     Hyperlipidemia     Psoriasis     Smoker       Past Surgical History:   Procedure Laterality Date    UTERINE FIBROID SURGERY        Family History   Problem Relation Name Age of Onset    Heart disease Mother      Cancer Father        Social History[1]   Medication List with Changes/Refills   Current Medications    ALBUTEROL (PROVENTIL/VENTOLIN HFA) 90 MCG/ACTUATION INHALER    Inhale 2 puffs into the lungs every 6 (six) hours as needed for Wheezing or Shortness of Breath (cough). Rescue    BUPROPION (WELLBUTRIN XL) 150 MG TB24 TABLET    Take 1 tablet (150 mg total) by mouth once daily.    FUROSEMIDE (LASIX) 20 MG  "TABLET    Take 1 tablet (20 mg total) by mouth daily as needed (leg swelling).    HALOBETASOL (ULTRAVATE) 0.05 % CREAM    Apply topically 2 (two) times daily as needed.    MELOXICAM (MOBIC) 7.5 MG TABLET    Take 1 tablet (7.5 mg total) by mouth daily as needed for Pain.    PANTOPRAZOLE (PROTONIX) 40 MG TABLET    Take 1 tablet (40 mg total) by mouth once daily.      Review of patient's allergies indicates:   Allergen Reactions    Crayfish Hives          Physical Exam:   BMI: Body mass index is 35.27 kg/m². 59 y.o. female  5' 4" (1.626 m) 93.2 kg (205 lb 7.5 oz) Visual inspection of the lumbar spine/back reveals no kyphosis, no obvious scoliosis.  The patient has a mildly antalgic gait.  Favoring the left lower extremity. There is no active paravertebral spasm.  The patient can independently arise from a sitting to a standing position.   Range of motion demonstrates the patient can perform full lateral bending torso twist lumbar flexion and extension.  Straight leg raise is positive.  Strength 4/5 left lower extremity.  sensation circulation are intact.     Left hip exam demonstrates that the patient can perform 90° flexion in a seated position however with flexion beyond 90° there is significant weakness and exacerbation of left groin pain.  She can perform abduction and adduction this is painful for her most noted with abduction she can perform full internal and external rotation with pain.        Imaging: Relevant imaging results reviewed and interpreted and discussed with the patient and/or family today.   X-rays left hip with pelvis 06/23/2025:  FINDINGS: No fracture, suspicious bone lesion, or other acute osseous abnormality is evident. Joint alignment is anatomic. Mild degenerative changes of the bilateral superior acetabulum. The sacroiliac joints and pubic symphysis are within normal limits.   X-Ray Lumbar Spine AP And Lateral  Narrative: EXAMINATION:  XR LUMBAR SPINE AP AND LATERAL    CLINICAL " HISTORY:  Dorsalgia, unspecified    TECHNIQUE:  XR LUMBAR SPINE AP AND LATERAL    COMPARISON:  None    FINDINGS:  Dextro asymmetry in the lumbar spine.    No vertebral fractures or collapse noted.Osseous demineralization.    Disc height loss at L5-S1.  Vertebral endplate sclerosis at L5-S1.  Facet arthropathy in the lower lumbar spine.    Atherosclerotic disease.  Impression: As above.    Electronically signed by: Clement Novoa  Date:    07/15/2025  Time:    13:37     Assessment/Plan:  1. Bilateral primary osteoarthritis of hip    2. Left hip pain    3. Lumbar pain    4. Spondylosis of lumbar region without myelopathy or radiculopathy       Patient Instructions   PT/OT:   I will send the patient to formal physical therapy at a facility that is convenient location for lumbar spine left hip stretching flexibility.      Medications:    Celebrex 200 mg b.i.d.  Gabapentin 300 mg q.h.s.     Return to clinic:    Referral to Dr. Figueroa at [facility] location in 4 weeks after completing PT for US guided Left hip injection  If no further relief after all conservative treatment measures including physical therapy, medications by mouth, injection in left hip are completed I would then recommend an MRI of either left hip and/or lumbar spine      I discussed worrisome and red flag signs and symptoms with the patient. The patient expressed understanding and agreed to alert me immediately or to go to the emergency room if they experience any of these.   Treatment plan was developed with input from the patient/family, and they expressed understanding and agreement with the plan. All questions were answered today.             ADEBAYO TreviñoC  Orthopedic Nurse Pracitioner  [facility]         [1]   Social History  Socioeconomic History    Marital status:    Tobacco Use    Smoking status: Former     Current packs/day: 1.50     Average packs/day: 1.5 packs/day for 40.0 years (60.0 ttl pk-yrs)     Types: Cigarettes     Smokeless tobacco: Never    Tobacco comments:     She hasn't smoked in 7 days.   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 [employer]    Brother mr cee in [place]     Social Drivers of Health     Financial Resource Strain: Low Risk  (4/28/2025)    Overall Financial Resource Strain (CARDIA)     Difficulty of Paying Living Expenses: Not hard at all   Food Insecurity: No Food Insecurity (4/28/2025)    Hunger Vital Sign     Worried About Running Out of Food in the Last Year: Never true     Ran Out of Food in the Last Year: Never true   Transportation Needs: No Transportation Needs (4/28/2025)    PRAPARE - Transportation     Lack of Transportation (Medical): No     Lack of Transportation (Non-Medical): No   Physical Activity: Unknown (4/28/2025)    Exercise Vital Sign     Days of Exercise per Week: 0 days   Stress: No Stress Concern Present (4/28/2025)    [employer] - Occupational Stress Questionnaire     Feeling of Stress : Not at all   Housing Stability: Low Risk  (4/28/2025)    Housing Stability Vital Sign     Unable to Pay for Housing in the Last Year: No     Number of Times Moved in the Last Year: 0     Homeless in the Last Year: No

## 2025-07-16 ENCOUNTER — PATIENT MESSAGE (OUTPATIENT)
Dept: ORTHOPEDICS | Facility: CLINIC | Age: 60
End: 2025-07-16
Payer: COMMERCIAL

## 2025-07-16 NOTE — PATIENT INSTRUCTIONS
PT/OT:   I will send the patient to formal physical therapy at a facility that is convenient location for lumbar spine left hip stretching flexibility.      Medications:    Celebrex 200 mg b.i.d.  Gabapentin 300 mg q.h.s.     Return to clinic:    Referral to Dr. Figueroa at [facility] location in 4 weeks after completing PT for US guided Left hip injection  If no further relief after all conservative treatment measures including physical therapy, medications by mouth, injection in left hip are completed I would then recommend an MRI of either left hip and/or lumbar spine

## 2025-07-21 ENCOUNTER — CLINICAL SUPPORT (OUTPATIENT)
Dept: PULMONOLOGY | Facility: CLINIC | Age: 60
End: 2025-07-21
Payer: COMMERCIAL

## 2025-07-21 VITALS — HEIGHT: 64 IN | WEIGHT: 207 LBS | BODY MASS INDEX: 35.34 KG/M2

## 2025-07-21 DIAGNOSIS — J43.2 CENTRILOBULAR EMPHYSEMA: ICD-10-CM

## 2025-07-21 PROCEDURE — 99999 PR PBB SHADOW E&M-EST. PATIENT-LVL II: CPT | Mod: PBBFAC,,,

## 2025-07-21 PROCEDURE — 99999 PR PBB SHADOW E&M-EST. PATIENT-LVL I: CPT | Mod: PBBFAC,,,

## 2025-07-21 PROCEDURE — 94060 EVALUATION OF WHEEZING: CPT | Mod: 59,S$GLB,, | Performed by: INTERNAL MEDICINE

## 2025-07-21 PROCEDURE — 94729 DIFFUSING CAPACITY: CPT | Mod: S$GLB,,, | Performed by: INTERNAL MEDICINE

## 2025-07-21 PROCEDURE — 94726 PLETHYSMOGRAPHY LUNG VOLUMES: CPT | Mod: S$GLB,,, | Performed by: INTERNAL MEDICINE

## 2025-07-21 PROCEDURE — 94618 PULMONARY STRESS TESTING: CPT | Mod: S$GLB,,, | Performed by: INTERNAL MEDICINE

## 2025-07-21 NOTE — PROCEDURES
"[facility] - Pulmonary Function  Six Minute Walk     SUMMARY     Ordering Provider: Dr. Davila   Interpreting Provider: Dr. Davila  Performing nurse/tech/RT: XANDER Montana, RRT  Diagnosis: Emphysema  Height: 5' 4" (162.6 cm)  Weight: 93.9 kg (207 lb)  BMI (Calculated): 35.5                Phase Oxygen Assessment Supplemental O2 Heart   Rate Blood Pressure Romeo Dyspnea Scale Rating   Resting 97 % Room Air 78 bpm 154/84 3   Exercise        Minute        1 96 % Room Air 105 bpm     2 96 % Room Air 111 bpm     3 96 % Room Air 113 bpm     4 96 % Room Air 112 bpm     5 96 % Room Air 115 bpm     6  97 % Room Air 116 bpm 177/86 4   Recovery        Minute        1 98 % Room Air 101 bpm     2 98 % Room Air 91 bpm     3 98 % Room Air 93 bpm     4 97 % Room Air 86 bpm 164/81 2     Six Minute Walk Summary  6MWT Status: completed without stopping  Patient Reported: Dyspnea, Leg/hip pain     Interpretation:  Did the patient stop or pause?: No         Total Time Walked (Calculated): 360 seconds  Final Partial Lap Distance (feet): 100 feet  Total Distance Meters (Calculated): 335.28 meters  Predicted Distance Meters (Calculated): 442.49 meters  Percentage of Predicted (Calculated): 75.77  Peak VO2 (Calculated): 14.04  Mets: 4.01  Has The Patient Had a Previous Six Minute Walk Test?: No       Previous 6MWT Results  Has The Patient Had a Previous Six Minute Walk Test?: No         CLINICAL INTERPRETATION:  Six minute walk distance is 335.28m (75.77 % predicted) with light dyspnea.  During exercise, there was no significant desaturation while breathing room air.  Blood pressure increased significantly and Heart rate remained stable with walking.  Hypertension was present prior to exercise.  The patient reported non-pulmonary symptoms during exercise.  The patient did complete the study, walking 360 seconds of the 360 second test.  Based upon age and body mass index, exercise capacity is normal.      [] Mild exercise-induced hypoxemia " described as an arterial oxygen saturation of 93-95% (or 3-4% less than at rest)  []  Moderate exercise-induced hypoxemia as 89-93%  []  Severe exercise induced hypoxemia as < 89% O2 saturation.  Medicare Criteria for oxygen prescription comments:   []  When arterial oxygen saturation is at or below 88% during exercise (severe exercise induced hypoxemia) then the patient falls under Medicare Group 1 criteria for supplemental oxygen

## 2025-07-23 ENCOUNTER — PATIENT MESSAGE (OUTPATIENT)
Dept: PULMONOLOGY | Facility: CLINIC | Age: 60
End: 2025-07-23
Payer: COMMERCIAL

## 2025-07-23 DIAGNOSIS — J43.2 CENTRILOBULAR EMPHYSEMA: Primary | ICD-10-CM

## 2025-07-24 ENCOUNTER — TELEPHONE (OUTPATIENT)
Dept: PULMONOLOGY | Facility: CLINIC | Age: 60
End: 2025-07-24
Payer: COMMERCIAL

## 2025-07-24 DIAGNOSIS — J43.2 CENTRILOBULAR EMPHYSEMA: Primary | ICD-10-CM

## 2025-07-24 RX ORDER — FLUTICASONE FUROATE, UMECLIDINIUM BROMIDE AND VILANTEROL TRIFENATATE 200; 62.5; 25 UG/1; UG/1; UG/1
1 POWDER RESPIRATORY (INHALATION) DAILY
Qty: 60 EACH | Refills: 11 | Status: SHIPPED | OUTPATIENT
Start: 2025-07-24

## 2025-07-24 NOTE — TELEPHONE ENCOUNTER
Called of on results of pulmonary function studies.  Pulmonary Function Studies consistent with emphysema  Keep f/u pulmonary appointment  Patient requests prescription for long term controller - Trelegy sent

## 2025-08-01 RX ORDER — FLUTICASONE FUROATE, UMECLIDINIUM BROMIDE AND VILANTEROL TRIFENATATE 200; 62.5; 25 UG/1; UG/1; UG/1
1 POWDER RESPIRATORY (INHALATION) DAILY
Qty: 60 EACH | Refills: 11 | Status: SHIPPED | OUTPATIENT
Start: 2025-08-01

## 2025-08-02 NOTE — TELEPHONE ENCOUNTER
prescription for Trelegy sent to [facility]  pharmacy at [facility] for evaluation for cheaper alternative

## 2025-08-13 ENCOUNTER — PATIENT MESSAGE (OUTPATIENT)
Dept: ADMINISTRATIVE | Facility: HOSPITAL | Age: 60
End: 2025-08-13
Payer: COMMERCIAL

## 2025-08-15 ENCOUNTER — OFFICE VISIT (OUTPATIENT)
Dept: SPORTS MEDICINE | Facility: CLINIC | Age: 60
End: 2025-08-15
Payer: COMMERCIAL

## 2025-08-15 DIAGNOSIS — M25.552 CHRONIC LEFT HIP PAIN: ICD-10-CM

## 2025-08-15 DIAGNOSIS — M47.816 LUMBAR FACET ARTHROPATHY: ICD-10-CM

## 2025-08-15 DIAGNOSIS — G89.29 CHRONIC BILATERAL LOW BACK PAIN WITHOUT SCIATICA: ICD-10-CM

## 2025-08-15 DIAGNOSIS — M16.0 BILATERAL PRIMARY OSTEOARTHRITIS OF HIP: Primary | ICD-10-CM

## 2025-08-15 DIAGNOSIS — G89.29 CHRONIC LEFT HIP PAIN: ICD-10-CM

## 2025-08-15 DIAGNOSIS — M54.50 CHRONIC BILATERAL LOW BACK PAIN WITHOUT SCIATICA: ICD-10-CM

## 2025-08-15 DIAGNOSIS — M51.360 DEGENERATION OF INTERVERTEBRAL DISC OF LUMBAR REGION WITH DISCOGENIC BACK PAIN: ICD-10-CM

## 2025-08-15 PROCEDURE — 99999 PR PBB SHADOW E&M-EST. PATIENT-LVL IV: CPT | Mod: PBBFAC,,, | Performed by: STUDENT IN AN ORGANIZED HEALTH CARE EDUCATION/TRAINING PROGRAM

## 2025-08-15 RX ORDER — GABAPENTIN 300 MG/1
300 CAPSULE ORAL 3 TIMES DAILY
Qty: 90 CAPSULE | Refills: 1 | Status: SHIPPED | OUTPATIENT
Start: 2025-08-15

## 2025-08-18 PROCEDURE — 95806 SLEEP STUDY UNATT&RESP EFFT: CPT

## 2025-08-21 ENCOUNTER — OFFICE VISIT (OUTPATIENT)
Dept: INTERNAL MEDICINE | Facility: CLINIC | Age: 60
End: 2025-08-21
Payer: COMMERCIAL

## 2025-08-21 ENCOUNTER — APPOINTMENT (OUTPATIENT)
Dept: LAB | Facility: HOSPITAL | Age: 60
End: 2025-08-21
Attending: NURSE PRACTITIONER
Payer: COMMERCIAL

## 2025-08-21 VITALS
OXYGEN SATURATION: 95 % | SYSTOLIC BLOOD PRESSURE: 132 MMHG | DIASTOLIC BLOOD PRESSURE: 100 MMHG | HEART RATE: 81 BPM | WEIGHT: 208.75 LBS | RESPIRATION RATE: 16 BRPM | HEIGHT: 64 IN | BODY MASS INDEX: 35.64 KG/M2 | TEMPERATURE: 99 F

## 2025-08-21 DIAGNOSIS — F32.1 CURRENT MODERATE EPISODE OF MAJOR DEPRESSIVE DISORDER WITHOUT PRIOR EPISODE: ICD-10-CM

## 2025-08-21 DIAGNOSIS — J43.9 PULMONARY EMPHYSEMA, UNSPECIFIED EMPHYSEMA TYPE: ICD-10-CM

## 2025-08-21 DIAGNOSIS — R73.03 PREDIABETES: ICD-10-CM

## 2025-08-21 DIAGNOSIS — E66.812 CLASS 2 OBESITY WITHOUT SERIOUS COMORBIDITY WITH BODY MASS INDEX (BMI) OF 35.0 TO 35.9 IN ADULT, UNSPECIFIED OBESITY TYPE: ICD-10-CM

## 2025-08-21 DIAGNOSIS — R53.83 FATIGUE, UNSPECIFIED TYPE: ICD-10-CM

## 2025-08-21 DIAGNOSIS — I10 PRIMARY HYPERTENSION: ICD-10-CM

## 2025-08-21 DIAGNOSIS — M16.0 BILATERAL PRIMARY OSTEOARTHRITIS OF HIP: ICD-10-CM

## 2025-08-21 DIAGNOSIS — F17.211 CIGARETTE NICOTINE DEPENDENCE IN REMISSION: ICD-10-CM

## 2025-08-21 DIAGNOSIS — I70.0 AORTIC ATHEROSCLEROSIS: ICD-10-CM

## 2025-08-21 DIAGNOSIS — R53.83 FATIGUE, UNSPECIFIED TYPE: Primary | ICD-10-CM

## 2025-08-21 DIAGNOSIS — J38.1 REINKE'S EDEMA OF VOCAL FOLDS: ICD-10-CM

## 2025-08-21 LAB
ABSOLUTE EOSINOPHIL (OHS): 0.19 K/UL
ABSOLUTE MONOCYTE (OHS): 0.74 K/UL (ref 0.3–1)
ABSOLUTE NEUTROPHIL COUNT (OHS): 6.35 K/UL (ref 1.8–7.7)
ALBUMIN SERPL BCP-MCNC: 4.5 G/DL (ref 3.5–5.2)
ALP SERPL-CCNC: 105 UNIT/L (ref 40–150)
ALT SERPL W/O P-5'-P-CCNC: 37 UNIT/L (ref 10–44)
ANION GAP (OHS): 16 MMOL/L (ref 8–16)
AST SERPL-CCNC: 32 UNIT/L (ref 11–45)
BASOPHILS # BLD AUTO: 0.06 K/UL
BASOPHILS NFR BLD AUTO: 0.6 %
BILIRUB SERPL-MCNC: 0.4 MG/DL (ref 0.1–1)
BUN SERPL-MCNC: 12 MG/DL (ref 8–23)
CALCIUM SERPL-MCNC: 9.9 MG/DL (ref 8.7–10.5)
CHLORIDE SERPL-SCNC: 104 MMOL/L (ref 95–110)
CO2 SERPL-SCNC: 23 MMOL/L (ref 23–29)
CREAT SERPL-MCNC: 0.7 MG/DL (ref 0.5–1.4)
ERYTHROCYTE [DISTWIDTH] IN BLOOD BY AUTOMATED COUNT: 12.3 % (ref 11.5–14.5)
FOLATE SERPL-MCNC: 10.4 NG/ML (ref 4–24)
GFR SERPLBLD CREATININE-BSD FMLA CKD-EPI: >60 ML/MIN/1.73/M2
GLUCOSE SERPL-MCNC: 117 MG/DL (ref 70–110)
HCT VFR BLD AUTO: 42.1 % (ref 37–48.5)
HGB BLD-MCNC: 14.1 GM/DL (ref 12–16)
IMM GRANULOCYTES # BLD AUTO: 0.12 K/UL (ref 0–0.04)
IMM GRANULOCYTES NFR BLD AUTO: 1.3 % (ref 0–0.5)
LYMPHOCYTES # BLD AUTO: 1.93 K/UL (ref 1–4.8)
MCH RBC QN AUTO: 32.6 PG (ref 27–31)
MCHC RBC AUTO-ENTMCNC: 33.5 G/DL (ref 32–36)
MCV RBC AUTO: 97 FL (ref 82–98)
NUCLEATED RBC (/100WBC) (OHS): 0 /100 WBC
PLATELET # BLD AUTO: 251 K/UL (ref 150–450)
PMV BLD AUTO: 10.7 FL (ref 9.2–12.9)
POTASSIUM SERPL-SCNC: 3.7 MMOL/L (ref 3.5–5.1)
PROT SERPL-MCNC: 7.6 GM/DL (ref 6–8.4)
RBC # BLD AUTO: 4.33 M/UL (ref 4–5.4)
RELATIVE EOSINOPHIL (OHS): 2 %
RELATIVE LYMPHOCYTE (OHS): 20.6 % (ref 18–48)
RELATIVE MONOCYTE (OHS): 7.9 % (ref 4–15)
RELATIVE NEUTROPHIL (OHS): 67.6 % (ref 38–73)
SODIUM SERPL-SCNC: 143 MMOL/L (ref 136–145)
T4 FREE SERPL-MCNC: 0.89 NG/DL (ref 0.71–1.51)
T4 FREE SERPL-MCNC: 0.89 NG/DL (ref 0.71–1.51)
TSH SERPL-ACNC: 2.87 UIU/ML (ref 0.4–4)
VIT B12 SERPL-MCNC: 673 PG/ML (ref 210–950)
WBC # BLD AUTO: 9.39 K/UL (ref 3.9–12.7)

## 2025-08-21 PROCEDURE — G2211 COMPLEX E/M VISIT ADD ON: HCPCS | Mod: S$GLB,,, | Performed by: NURSE PRACTITIONER

## 2025-08-21 PROCEDURE — 85025 COMPLETE CBC W/AUTO DIFF WBC: CPT | Mod: PO

## 2025-08-21 PROCEDURE — 3008F BODY MASS INDEX DOCD: CPT | Mod: CPTII,S$GLB,, | Performed by: NURSE PRACTITIONER

## 2025-08-21 PROCEDURE — 84439 ASSAY OF FREE THYROXINE: CPT | Mod: PO

## 2025-08-21 PROCEDURE — 1160F RVW MEDS BY RX/DR IN RCRD: CPT | Mod: CPTII,S$GLB,, | Performed by: NURSE PRACTITIONER

## 2025-08-21 PROCEDURE — 80053 COMPREHEN METABOLIC PANEL: CPT | Mod: PO

## 2025-08-21 PROCEDURE — 99214 OFFICE O/P EST MOD 30 MIN: CPT | Mod: S$GLB,,, | Performed by: NURSE PRACTITIONER

## 2025-08-21 PROCEDURE — 3075F SYST BP GE 130 - 139MM HG: CPT | Mod: CPTII,S$GLB,, | Performed by: NURSE PRACTITIONER

## 2025-08-21 PROCEDURE — 36415 COLL VENOUS BLD VENIPUNCTURE: CPT | Mod: PO

## 2025-08-21 PROCEDURE — 3044F HG A1C LEVEL LT 7.0%: CPT | Mod: CPTII,S$GLB,, | Performed by: NURSE PRACTITIONER

## 2025-08-21 PROCEDURE — 3080F DIAST BP >= 90 MM HG: CPT | Mod: CPTII,S$GLB,, | Performed by: NURSE PRACTITIONER

## 2025-08-21 PROCEDURE — 1159F MED LIST DOCD IN RCRD: CPT | Mod: CPTII,S$GLB,, | Performed by: NURSE PRACTITIONER

## 2025-08-21 PROCEDURE — 82607 VITAMIN B-12: CPT

## 2025-08-21 PROCEDURE — 99999 PR PBB SHADOW E&M-EST. PATIENT-LVL IV: CPT | Mod: PBBFAC,,, | Performed by: NURSE PRACTITIONER

## 2025-08-21 PROCEDURE — 82746 ASSAY OF FOLIC ACID SERUM: CPT

## 2025-08-21 RX ORDER — AMLODIPINE BESYLATE 5 MG/1
5 TABLET ORAL DAILY
Qty: 90 TABLET | Refills: 3 | Status: SHIPPED | OUTPATIENT
Start: 2025-08-21 | End: 2026-08-21

## 2025-08-22 ENCOUNTER — HOSPITAL ENCOUNTER (OUTPATIENT)
Dept: SLEEP MEDICINE | Facility: HOSPITAL | Age: 60
Discharge: HOME | End: 2025-08-22
Attending: INTERNAL MEDICINE
Payer: COMMERCIAL

## 2025-08-22 DIAGNOSIS — G47.8 NON-RESTORATIVE SLEEP: ICD-10-CM

## 2025-08-22 DIAGNOSIS — R06.83 SNORING: ICD-10-CM

## 2025-08-22 PROCEDURE — 95806 SLEEP STUDY UNATT&RESP EFFT: CPT

## 2025-08-24 PROBLEM — R73.03 PREDIABETES: Status: ACTIVE | Noted: 2025-08-24

## 2025-08-24 PROBLEM — I10 PRIMARY HYPERTENSION: Status: ACTIVE | Noted: 2025-08-24

## 2025-08-24 PROBLEM — J38.1 REINKE'S EDEMA OF VOCAL FOLDS: Status: ACTIVE | Noted: 2025-08-24

## 2025-08-24 PROBLEM — F17.211 CIGARETTE NICOTINE DEPENDENCE IN REMISSION: Status: ACTIVE | Noted: 2017-11-29

## 2025-08-27 ENCOUNTER — PATIENT OUTREACH (OUTPATIENT)
Dept: ADMINISTRATIVE | Facility: HOSPITAL | Age: 60
End: 2025-08-27
Payer: COMMERCIAL

## 2025-08-28 ENCOUNTER — PATIENT MESSAGE (OUTPATIENT)
Dept: PULMONOLOGY | Facility: CLINIC | Age: 60
End: 2025-08-28
Payer: COMMERCIAL

## 2025-08-28 PROBLEM — R06.83 SNORING: Status: ACTIVE | Noted: 2025-08-28

## 2025-08-29 ENCOUNTER — TELEPHONE (OUTPATIENT)
Dept: OTOLARYNGOLOGY | Facility: CLINIC | Age: 60
End: 2025-08-29
Payer: COMMERCIAL

## 2025-09-01 PROBLEM — M54.50 LUMBAR PAIN: Status: RESOLVED | Noted: 2025-07-15 | Resolved: 2025-09-01

## 2025-09-04 ENCOUNTER — TELEPHONE (OUTPATIENT)
Dept: PULMONOLOGY | Facility: CLINIC | Age: 60
End: 2025-09-04
Payer: COMMERCIAL